# Patient Record
Sex: MALE | Race: WHITE | HISPANIC OR LATINO | Employment: FULL TIME | URBAN - METROPOLITAN AREA
[De-identification: names, ages, dates, MRNs, and addresses within clinical notes are randomized per-mention and may not be internally consistent; named-entity substitution may affect disease eponyms.]

---

## 2017-05-15 ENCOUNTER — HOSPITAL ENCOUNTER (EMERGENCY)
Facility: HOSPITAL | Age: 37
Discharge: HOME | End: 2017-05-15
Attending: EMERGENCY MEDICINE
Payer: COMMERCIAL

## 2017-05-15 ENCOUNTER — APPOINTMENT (EMERGENCY)
Dept: RADIOLOGY | Facility: HOSPITAL | Age: 37
End: 2017-05-15
Payer: COMMERCIAL

## 2017-05-15 VITALS
DIASTOLIC BLOOD PRESSURE: 86 MMHG | TEMPERATURE: 97.7 F | RESPIRATION RATE: 20 BRPM | SYSTOLIC BLOOD PRESSURE: 137 MMHG | WEIGHT: 145 LBS | HEART RATE: 79 BPM | OXYGEN SATURATION: 98 %

## 2017-05-15 DIAGNOSIS — J40 BRONCHITIS: Primary | ICD-10-CM

## 2017-05-15 LAB
ALBUMIN SERPL BCP-MCNC: 3.4 G/DL (ref 3.5–5)
ALP SERPL-CCNC: 89 U/L (ref 46–116)
ALT SERPL W P-5'-P-CCNC: 23 U/L (ref 12–78)
ANION GAP SERPL CALCULATED.3IONS-SCNC: 10 MMOL/L (ref 4–13)
AST SERPL W P-5'-P-CCNC: 13 U/L (ref 5–45)
BASOPHILS # BLD AUTO: 0 THOUSANDS/ΜL (ref 0–0.1)
BASOPHILS NFR BLD AUTO: 0 % (ref 0–1)
BILIRUB SERPL-MCNC: 0.5 MG/DL (ref 0.2–1)
BUN SERPL-MCNC: 18 MG/DL (ref 5–25)
CALCIUM SERPL-MCNC: 8.8 MG/DL (ref 8.3–10.1)
CHLORIDE SERPL-SCNC: 103 MMOL/L (ref 100–108)
CO2 SERPL-SCNC: 27 MMOL/L (ref 21–32)
CREAT SERPL-MCNC: 0.89 MG/DL (ref 0.6–1.3)
DEPRECATED D DIMER PPP: 280 NG/ML (FEU) (ref 190–520)
EOSINOPHIL # BLD AUTO: 0.7 THOUSAND/ΜL (ref 0–0.61)
EOSINOPHIL NFR BLD AUTO: 5 % (ref 0–6)
ERYTHROCYTE [DISTWIDTH] IN BLOOD BY AUTOMATED COUNT: 13.7 % (ref 11.6–15.1)
GFR SERPL CREATININE-BSD FRML MDRD: >60 ML/MIN/1.73SQ M
GLUCOSE SERPL-MCNC: 111 MG/DL (ref 65–140)
HCT VFR BLD AUTO: 42.5 % (ref 42–52)
HGB BLD-MCNC: 14.3 G/DL (ref 14–18)
LYMPHOCYTES # BLD AUTO: 2 THOUSANDS/ΜL (ref 0.6–4.47)
LYMPHOCYTES NFR BLD AUTO: 14 % (ref 14–44)
MCH RBC QN AUTO: 30.4 PG (ref 27–31)
MCHC RBC AUTO-ENTMCNC: 33.5 G/DL (ref 31.4–37.4)
MCV RBC AUTO: 91 FL (ref 82–98)
MONOCYTES # BLD AUTO: 0.7 THOUSAND/ΜL (ref 0.17–1.22)
MONOCYTES NFR BLD AUTO: 5 % (ref 4–12)
NEUTROPHILS # BLD AUTO: 10.2 THOUSANDS/ΜL (ref 1.85–7.62)
NEUTS SEG NFR BLD AUTO: 75 % (ref 43–75)
NRBC BLD AUTO-RTO: 0 /100 WBCS
PLATELET # BLD AUTO: 245 THOUSANDS/UL (ref 130–400)
PMV BLD AUTO: 7.8 FL (ref 8.9–12.7)
POTASSIUM SERPL-SCNC: 4.1 MMOL/L (ref 3.5–5.3)
PROT SERPL-MCNC: 7.2 G/DL (ref 6.4–8.2)
RBC # BLD AUTO: 4.69 MILLION/UL (ref 4.7–6.1)
SODIUM SERPL-SCNC: 140 MMOL/L (ref 136–145)
TROPONIN I SERPL-MCNC: <0.02 NG/ML
WBC # BLD AUTO: 13.6 THOUSAND/UL (ref 4.8–10.8)

## 2017-05-15 PROCEDURE — 84484 ASSAY OF TROPONIN QUANT: CPT | Performed by: EMERGENCY MEDICINE

## 2017-05-15 PROCEDURE — 93005 ELECTROCARDIOGRAM TRACING: CPT | Performed by: EMERGENCY MEDICINE

## 2017-05-15 PROCEDURE — 85379 FIBRIN DEGRADATION QUANT: CPT | Performed by: EMERGENCY MEDICINE

## 2017-05-15 PROCEDURE — 96374 THER/PROPH/DIAG INJ IV PUSH: CPT

## 2017-05-15 PROCEDURE — 99285 EMERGENCY DEPT VISIT HI MDM: CPT

## 2017-05-15 PROCEDURE — 71020 HB CHEST X-RAY 2VW FRONTAL&LATL: CPT

## 2017-05-15 PROCEDURE — 80053 COMPREHEN METABOLIC PANEL: CPT | Performed by: EMERGENCY MEDICINE

## 2017-05-15 PROCEDURE — 85025 COMPLETE CBC W/AUTO DIFF WBC: CPT | Performed by: EMERGENCY MEDICINE

## 2017-05-15 PROCEDURE — 94640 AIRWAY INHALATION TREATMENT: CPT

## 2017-05-15 PROCEDURE — 36415 COLL VENOUS BLD VENIPUNCTURE: CPT | Performed by: EMERGENCY MEDICINE

## 2017-05-15 PROCEDURE — 96361 HYDRATE IV INFUSION ADD-ON: CPT

## 2017-05-15 RX ORDER — PSEUDOEPHEDRINE HYDROCHLORIDE 30 MG/1
30 TABLET ORAL EVERY 6 HOURS PRN
Qty: 15 TABLET | Refills: 0 | Status: SHIPPED | OUTPATIENT
Start: 2017-05-15 | End: 2020-10-24

## 2017-05-15 RX ORDER — ALBUTEROL SULFATE 90 UG/1
2 AEROSOL, METERED RESPIRATORY (INHALATION) EVERY 6 HOURS PRN
COMMUNITY

## 2017-05-15 RX ORDER — IBUPROFEN 600 MG/1
600 TABLET ORAL EVERY 6 HOURS PRN
Qty: 15 TABLET | Refills: 0 | Status: SHIPPED | OUTPATIENT
Start: 2017-05-15 | End: 2020-10-24

## 2017-05-15 RX ORDER — AZITHROMYCIN 250 MG/1
500 TABLET, FILM COATED ORAL ONCE
Status: COMPLETED | OUTPATIENT
Start: 2017-05-15 | End: 2017-05-15

## 2017-05-15 RX ORDER — KETOROLAC TROMETHAMINE 30 MG/ML
30 INJECTION, SOLUTION INTRAMUSCULAR; INTRAVENOUS ONCE
Status: COMPLETED | OUTPATIENT
Start: 2017-05-15 | End: 2017-05-15

## 2017-05-15 RX ORDER — PREDNISONE 50 MG/1
50 TABLET ORAL DAILY
Qty: 4 TABLET | Refills: 0 | Status: SHIPPED | OUTPATIENT
Start: 2017-05-15 | End: 2017-05-19

## 2017-05-15 RX ORDER — IPRATROPIUM BROMIDE AND ALBUTEROL SULFATE 2.5; .5 MG/3ML; MG/3ML
3 SOLUTION RESPIRATORY (INHALATION) ONCE
Status: COMPLETED | OUTPATIENT
Start: 2017-05-15 | End: 2017-05-15

## 2017-05-15 RX ORDER — ALBUTEROL SULFATE 2.5 MG/3ML
2.5 SOLUTION RESPIRATORY (INHALATION) EVERY 6 HOURS PRN
COMMUNITY

## 2017-05-15 RX ORDER — ALBUTEROL SULFATE 90 UG/1
2 AEROSOL, METERED RESPIRATORY (INHALATION) EVERY 4 HOURS PRN
Qty: 1 INHALER | Refills: 0 | Status: SHIPPED | OUTPATIENT
Start: 2017-05-15 | End: 2017-06-14

## 2017-05-15 RX ORDER — PREDNISONE 20 MG/1
60 TABLET ORAL ONCE
Status: COMPLETED | OUTPATIENT
Start: 2017-05-15 | End: 2017-05-15

## 2017-05-15 RX ORDER — PSEUDOEPHEDRINE HYDROCHLORIDE 30 MG/1
30 TABLET ORAL ONCE
Status: COMPLETED | OUTPATIENT
Start: 2017-05-15 | End: 2017-05-15

## 2017-05-15 RX ORDER — AZITHROMYCIN 250 MG/1
250 TABLET, FILM COATED ORAL DAILY
Qty: 4 TABLET | Refills: 0 | Status: SHIPPED | OUTPATIENT
Start: 2017-05-15 | End: 2017-05-19

## 2017-05-15 RX ADMIN — IPRATROPIUM BROMIDE AND ALBUTEROL SULFATE 3 ML: .5; 3 SOLUTION RESPIRATORY (INHALATION) at 07:39

## 2017-05-15 RX ADMIN — AZITHROMYCIN 500 MG: 250 TABLET, FILM COATED ORAL at 08:40

## 2017-05-15 RX ADMIN — PSEUDOEPHEDRINE HCL 30 MG: 30 TABLET, COATED ORAL at 07:39

## 2017-05-15 RX ADMIN — PREDNISONE 60 MG: 20 TABLET ORAL at 08:40

## 2017-05-15 RX ADMIN — IPRATROPIUM BROMIDE AND ALBUTEROL SULFATE 3 ML: .5; 3 SOLUTION RESPIRATORY (INHALATION) at 08:40

## 2017-05-15 RX ADMIN — SODIUM CHLORIDE 1000 ML: 0.9 INJECTION, SOLUTION INTRAVENOUS at 07:39

## 2017-05-15 RX ADMIN — KETOROLAC TROMETHAMINE 30 MG: 30 INJECTION, SOLUTION INTRAMUSCULAR at 07:39

## 2017-05-16 LAB
ATRIAL RATE: 68 BPM
P AXIS: 29 DEGREES
PR INTERVAL: 146 MS
QRS AXIS: 47 DEGREES
QRSD INTERVAL: 92 MS
QT INTERVAL: 380 MS
QTC INTERVAL: 404 MS
T WAVE AXIS: 33 DEGREES
VENTRICULAR RATE: 68 BPM

## 2017-11-24 ENCOUNTER — GENERIC CONVERSION - ENCOUNTER (OUTPATIENT)
Dept: OTHER | Facility: OTHER | Age: 37
End: 2017-11-24

## 2017-12-13 ENCOUNTER — APPOINTMENT (EMERGENCY)
Dept: RADIOLOGY | Facility: HOSPITAL | Age: 37
End: 2017-12-13
Payer: COMMERCIAL

## 2017-12-13 ENCOUNTER — HOSPITAL ENCOUNTER (EMERGENCY)
Facility: HOSPITAL | Age: 37
Discharge: HOME | End: 2017-12-13
Attending: EMERGENCY MEDICINE | Admitting: EMERGENCY MEDICINE
Payer: COMMERCIAL

## 2017-12-13 VITALS
WEIGHT: 147 LBS | RESPIRATION RATE: 20 BRPM | TEMPERATURE: 97.7 F | OXYGEN SATURATION: 96 % | DIASTOLIC BLOOD PRESSURE: 66 MMHG | BODY MASS INDEX: 21.77 KG/M2 | HEART RATE: 86 BPM | SYSTOLIC BLOOD PRESSURE: 130 MMHG | HEIGHT: 69 IN

## 2017-12-13 DIAGNOSIS — J45.909 ASTHMA: Primary | ICD-10-CM

## 2017-12-13 PROCEDURE — 71010 HB CHEST X-RAY 1 VIEW FRONTAL (PORTABLE): CPT

## 2017-12-13 PROCEDURE — 94640 AIRWAY INHALATION TREATMENT: CPT

## 2017-12-13 PROCEDURE — 99283 EMERGENCY DEPT VISIT LOW MDM: CPT

## 2017-12-13 RX ORDER — PREDNISONE 20 MG/1
40 TABLET ORAL DAILY
Qty: 10 TABLET | Refills: 0 | Status: SHIPPED | OUTPATIENT
Start: 2017-12-13 | End: 2017-12-18

## 2017-12-13 RX ORDER — IPRATROPIUM BROMIDE AND ALBUTEROL SULFATE 2.5; .5 MG/3ML; MG/3ML
3 SOLUTION RESPIRATORY (INHALATION) ONCE
Status: COMPLETED | OUTPATIENT
Start: 2017-12-13 | End: 2017-12-13

## 2017-12-13 RX ORDER — PREDNISONE 20 MG/1
60 TABLET ORAL ONCE
Status: COMPLETED | OUTPATIENT
Start: 2017-12-13 | End: 2017-12-13

## 2017-12-13 RX ADMIN — PREDNISONE 60 MG: 20 TABLET ORAL at 15:25

## 2017-12-13 RX ADMIN — IPRATROPIUM BROMIDE AND ALBUTEROL SULFATE 3 ML: .5; 3 SOLUTION RESPIRATORY (INHALATION) at 15:26

## 2017-12-13 NOTE — DISCHARGE INSTRUCTIONS
Asma   LO QUE NECESITA SABER:   El asma es yang enfermedad pulmonar que dificulta la respiración  La inflamación crónica y las reacciones a los desencadenantes estrechan las vías respiratorias en los pulmones  El asma puede ser de peligro mortal si no se mantiene bajo control  INSTRUCCIONES SOBRE [facility]ITAL[facility]:   Busque atención médica de inmediato si:   · Usted tiene falta de aliento severa  · Danielle labios y [place] se ponen azules o grises  · La piel alrededor de damon spencer y costillas se hunde con cada respiración  · Usted tiene falta de [place], incluso después de silviano damon medicamento a corto plazo según lo indicado  · Las lecturas numéricas del medidor de damon flujo vernell están en la astrid qamar de damon plan de acción para el asma  Pregúntele a damon Severiano Nones vitaminas y minerales son adecuados para usted  · A usted se le acaba el medicamento antes de la fecha de damon próximo abastecimiento  · Danielle síntomas empeoran  · Usted necesita silviano más medicamento que lo acostumbrado para controlar danielle síntomas  · Usted tiene preguntas o inquietudes acerca de damon condición o cuidado  Medicamentos:   · Medicamentos,  disminuyen la inflamación, abren las vías respiratorias y facilitan damon respiración  Los medicamentos se pueden inhalar, silviano en forma de píldora o ser inyectados  Los medicamentos a corto plazo alivian danielle síntomas con Yari Skains  Los medicamentos a haris plazo sirven para evitar ataques en un futuro  Es posible que además necesite medicamento para ayudar a controlar las alergias  Pregúntele a damon médico por más información sobre el medicamento que le están dando y cómo tomarlo de yang forma Eulice Richey  · [place] danielle medicamentos casie se le haya indicado  Consulte con damon médico si usted maddie que damon medicamento no le está ayudando o si presenta efectos secundarios  Infórmele si es alérgico a cualquier medicamento   Mantenga yang lista actualizada de los [place], las vitaminas y METHLICK productos herbales que bhanu  Incluya los siguientes datos de los medicamentos: cantidad, frecuencia y motivo de administración  Traiga con usted la lista o los envases de la píldoras a lyndon citas de seguimiento  Lleve la lista de los medicamentos con usted en shaun de yang emergencia  Acuda a lyndon consultas de control con damon médico según le indicaron  Usted necesitará regresar para asegurarse que damon medicamento está funcionando y lyndon síntomas están bajo control  Es posible que lo refieran a un especialista del asma  A usted podrían pedirle que [address] [place] [address] Street un registro de los valores de damon flujo vernell y que lo traiga a lyndon citas  Anote lyndon preguntas para que se acuerde de hacerlas  Controle lyndon síntomas y evite ataques futuros:   · Siga damon plan de acción para el asma  [place] es un plan por escrito que usted y damon médico boswell creado  Explica cuáles medicamentos necesita usted y cuándo cambiar las dosis si fuera necesario  Además explica casie usted puede monitorear lyndon síntomas y usar un medidor del flujo vernlel  El medidor mide qué tan tulio están funcionando los pulmones  · Controle otras afecciones de [place] , casie las [place], el reflujo estomacal y la apnea de sueño  · Identifique y evite factores desencadenantes  Estos podrían [employer], los ácaros del [place], el moho y las cucarachas  · No fume o esté alrededor de personas que fuman  La nicotina y otras sustancias químicas que contienen los cigarrillos y cigarros pueden dañar los pulmones  Pida información a damon médico si usted actualmente fuma y necesita ayuda para dejar de fumar  Los cigarrillos electrónicos o tabaco sin humo todavía contienen nicotina  Consulte con damon médico antes de [employer]  · Pregunte sobre la vacuna contra la gripe  La gripe puede empeorar damon asma  Puede que usted necesite recibir yang vacuna anual contra la gripe    © 2017 [address] Information is for End User's use only and may not be sold, redistributed or otherwise used for commercial purposes  All illustrations and images included in CareNotes® are the copyrighted property of A D A M , Inc  or Familia Bey  Esta información es sólo para uso en educación  Damon intención no es darle un consejo médico sobre enfermedades o tratamientos  Colsulte con damon Dewain Racer farmacéutico antes de seguir cualquier régimen médico para saber si es seguro y efectivo para usted

## 2017-12-14 NOTE — ED PROVIDER NOTES
History  Chief Complaint   Patient presents with    Asthma     pt c/o exac asthma x a couple weeks  states cough x 1 month  Patient presents for dyspnea  History of asthma worse over the last month  Denies fever  Was seen at [facility] recently and given steroids which improved symptoms but have since gotten worse since stopping them  Was suppose to get cxr but never followed up  History provided by:  Patient   used: No    Asthma   Associated symptoms: cough and shortness of breath    Associated symptoms: no fever        Prior to Admission Medications   Prescriptions Last Dose Informant Patient Reported? Taking? albuterol (2 5 mg/3 mL) 0 083 % nebulizer solution   Yes No   Sig: Take 2 5 mg by nebulization every 6 (six) hours as needed for wheezing   albuterol (PROVENTIL HFA,VENTOLIN HFA) 90 mcg/act inhaler   Yes No   Sig: Inhale 2 puffs every 6 (six) hours as needed for wheezing   ibuprofen (MOTRIN) 600 mg tablet   No No   Sig: Take 1 tablet by mouth every 6 (six) hours as needed for mild pain for up to 10 days   pseudoephedrine (SUDAFED) 30 mg tablet   No No   Sig: Take 1 tablet by mouth every 6 (six) hours as needed for congestion for up to 10 days      Facility-Administered Medications: None       Past Medical History:   Diagnosis Date    Asthma     Diverticulitis     Pneumothorax        History reviewed  No pertinent surgical history  History reviewed  No pertinent family history  I have reviewed and agree with the history as documented  Social History   Substance Use Topics    Smoking status: Former Smoker    Smokeless tobacco: Never Used    Alcohol use Yes        Review of Systems   Constitutional: Negative for chills and fever  Respiratory: Positive for cough, shortness of breath and stridor  All other systems reviewed and are negative        Physical Exam  ED Triage Vitals [12/13/17 1507]   Temperature Pulse Respirations Blood Pressure SpO2   97 7 °F (36 5 °C) 86 20 130/66 96 %      Temp Source Heart Rate Source Patient Position - Orthostatic VS BP Location FiO2 (%)   Oral Monitor Sitting Right arm --      Pain Score       5           Orthostatic Vital Signs  Vitals:    12/13/17 1507   BP: 130/66   Pulse: 86   Patient Position - Orthostatic VS: Sitting       Physical Exam   Constitutional: He is oriented to person, place, and time  No distress  HENT:   Mouth/Throat: Oropharynx is clear and moist    Eyes: Pupils are equal, round, and reactive to light  Neck: Normal range of motion  Cardiovascular: Normal rate, regular rhythm and intact distal pulses  Pulmonary/Chest: He is in respiratory distress  He has wheezes  Mild respiratory distress  Abdominal: Soft  There is no tenderness  Musculoskeletal: Normal range of motion  Neurological: He is alert and oriented to person, place, and time  Skin: Capillary refill takes less than 2 seconds  He is not diaphoretic  Nursing note and vitals reviewed  ED Medications  Medications   ipratropium-albuterol (DUO-NEB) 0 5-2 5 mg/mL inhalation solution 3 mL (3 mL Nebulization Given 12/13/17 1526)   ipratropium-albuterol (DUO-NEB) 0 5-2 5 mg/mL inhalation solution 3 mL (3 mL Nebulization Given 12/13/17 1526)   predniSONE tablet 60 mg (60 mg Oral Given 12/13/17 1525)       Diagnostic Studies  Results Reviewed     None                 XR chest 1 view portable   Final Result by Froy Hidalgo MD (12/13 1555)      No active pulmonary disease  Workstation performed: [id number]                    Procedures  Procedures       Phone Contacts  ED Phone Contact    ED Course  ED Course                                MDM  Number of Diagnoses or Management Options  Asthma:   Diagnosis management comments: Pulse ox 96% on RA indicating adequate oxygenation  CXR: NAD as read by me    On re-exam patient improved  Advised follow up with PMD for better long term asthma control          Amount and/or Complexity of Data Reviewed  Tests in the radiology section of CPT®: ordered and reviewed  Decide to obtain previous medical records or to obtain history from someone other than the patient: yes  Review and summarize past medical records: yes  Independent visualization of images, tracings, or specimens: yes    Patient Progress  Patient progress: improved    CritCare Time    Disposition  Final diagnoses:   Asthma     Time reflects when diagnosis was documented in both MDM as applicable and the Disposition within this note     Time User Action Codes Description Comment    12/13/2017  4:23 PM Gucci Cullen Asthma       ED Disposition     ED Disposition Condition Comment    Discharge  [address] discharge to home/self care      Condition at discharge: stable        Follow-up Information     Follow up With Specialties Details Why [address]  In 3 days  Shannon Mc [phone]        Discharge Medication List as of 12/13/2017  4:24 PM      START taking these medications    Details   predniSONE 20 mg tablet Take 2 tablets by mouth daily for 5 days, Starting Wed 12/13/2017, Until Mon 12/18/2017, Print         CONTINUE these medications which have NOT CHANGED    Details   albuterol (2 5 mg/3 mL) 0 083 % nebulizer solution Take 2 5 mg by nebulization every 6 (six) hours as needed for wheezing, Until Discontinued, Historical Med      albuterol (PROVENTIL HFA,VENTOLIN HFA) 90 mcg/act inhaler Inhale 2 puffs every 6 (six) hours as needed for wheezing, Until Discontinued, Historical Med      ibuprofen (MOTRIN) 600 mg tablet Take 1 tablet by mouth every 6 (six) hours as needed for mild pain for up to 10 days, Starting 5/15/2017, Until Thu 5/25/17, Print      pseudoephedrine (SUDAFED) 30 mg tablet Take 1 tablet by mouth every 6 (six) hours as needed for congestion for up to 10 days, Starting 5/15/2017, Until Thu 5/25/17, Print           No discharge procedures on file     ED Provider  Electronically Signed by           Francisco Shields DO  12/13/17 Yaritza Nguyen

## 2018-01-22 VITALS
HEIGHT: 70 IN | TEMPERATURE: 97.9 F | OXYGEN SATURATION: 98 % | BODY MASS INDEX: 22.05 KG/M2 | HEART RATE: 87 BPM | WEIGHT: 154 LBS | SYSTOLIC BLOOD PRESSURE: 100 MMHG | DIASTOLIC BLOOD PRESSURE: 70 MMHG | RESPIRATION RATE: 20 BRPM

## 2019-10-07 ENCOUNTER — APPOINTMENT (EMERGENCY)
Dept: RADIOLOGY | Facility: HOSPITAL | Age: 39
End: 2019-10-07
Payer: COMMERCIAL

## 2019-10-07 ENCOUNTER — HOSPITAL ENCOUNTER (EMERGENCY)
Facility: HOSPITAL | Age: 39
Discharge: HOME | End: 2019-10-07
Attending: EMERGENCY MEDICINE
Payer: COMMERCIAL

## 2019-10-07 VITALS
OXYGEN SATURATION: 96 % | WEIGHT: 160 LBS | HEART RATE: 64 BPM | TEMPERATURE: 96.9 F | DIASTOLIC BLOOD PRESSURE: 72 MMHG | RESPIRATION RATE: 16 BRPM | SYSTOLIC BLOOD PRESSURE: 133 MMHG | BODY MASS INDEX: 23.63 KG/M2

## 2019-10-07 DIAGNOSIS — J45.901 ASTHMA EXACERBATION: Primary | ICD-10-CM

## 2019-10-07 LAB
ANION GAP SERPL CALCULATED.3IONS-SCNC: 8 MMOL/L (ref 4–13)
BASOPHILS # BLD AUTO: 0.04 THOUSANDS/ΜL (ref 0–0.1)
BASOPHILS NFR BLD AUTO: 1 % (ref 0–1)
BUN SERPL-MCNC: 15 MG/DL (ref 5–25)
CALCIUM SERPL-MCNC: 8.5 MG/DL (ref 8.3–10.1)
CHLORIDE SERPL-SCNC: 105 MMOL/L (ref 100–108)
CO2 SERPL-SCNC: 26 MMOL/L (ref 21–32)
CREAT SERPL-MCNC: 0.91 MG/DL (ref 0.6–1.3)
EOSINOPHIL # BLD AUTO: 0.71 THOUSAND/ΜL (ref 0–0.61)
EOSINOPHIL NFR BLD AUTO: 13 % (ref 0–6)
ERYTHROCYTE [DISTWIDTH] IN BLOOD BY AUTOMATED COUNT: 12.8 % (ref 11.6–15.1)
GFR SERPL CREATININE-BSD FRML MDRD: 106 ML/MIN/1.73SQ M
GLUCOSE SERPL-MCNC: 100 MG/DL (ref 65–140)
HCT VFR BLD AUTO: 44.2 % (ref 36.5–49.3)
HGB BLD-MCNC: 14.5 G/DL (ref 12–17)
IMM GRANULOCYTES # BLD AUTO: 0.01 THOUSAND/UL (ref 0–0.2)
IMM GRANULOCYTES NFR BLD AUTO: 0 % (ref 0–2)
LYMPHOCYTES # BLD AUTO: 2.17 THOUSANDS/ΜL (ref 0.6–4.47)
LYMPHOCYTES NFR BLD AUTO: 38 % (ref 14–44)
MCH RBC QN AUTO: 30.6 PG (ref 26.8–34.3)
MCHC RBC AUTO-ENTMCNC: 32.8 G/DL (ref 31.4–37.4)
MCV RBC AUTO: 93 FL (ref 82–98)
MONOCYTES # BLD AUTO: 0.45 THOUSAND/ΜL (ref 0.17–1.22)
MONOCYTES NFR BLD AUTO: 8 % (ref 4–12)
NEUTROPHILS # BLD AUTO: 2.3 THOUSANDS/ΜL (ref 1.85–7.62)
NEUTS SEG NFR BLD AUTO: 40 % (ref 43–75)
NRBC BLD AUTO-RTO: 0 /100 WBCS
PLATELET # BLD AUTO: 235 THOUSANDS/UL (ref 149–390)
PMV BLD AUTO: 10 FL (ref 8.9–12.7)
POTASSIUM SERPL-SCNC: 3.8 MMOL/L (ref 3.5–5.3)
RBC # BLD AUTO: 4.74 MILLION/UL (ref 3.88–5.62)
SODIUM SERPL-SCNC: 139 MMOL/L (ref 136–145)
TROPONIN I SERPL-MCNC: <0.02 NG/ML
WBC # BLD AUTO: 5.68 THOUSAND/UL (ref 4.31–10.16)

## 2019-10-07 PROCEDURE — 93005 ELECTROCARDIOGRAM TRACING: CPT

## 2019-10-07 PROCEDURE — 99285 EMERGENCY DEPT VISIT HI MDM: CPT

## 2019-10-07 PROCEDURE — 80048 BASIC METABOLIC PNL TOTAL CA: CPT | Performed by: EMERGENCY MEDICINE

## 2019-10-07 PROCEDURE — 85025 COMPLETE CBC W/AUTO DIFF WBC: CPT | Performed by: EMERGENCY MEDICINE

## 2019-10-07 PROCEDURE — 84484 ASSAY OF TROPONIN QUANT: CPT | Performed by: EMERGENCY MEDICINE

## 2019-10-07 PROCEDURE — 96374 THER/PROPH/DIAG INJ IV PUSH: CPT

## 2019-10-07 PROCEDURE — 71045 X-RAY EXAM CHEST 1 VIEW: CPT

## 2019-10-07 PROCEDURE — 94640 AIRWAY INHALATION TREATMENT: CPT

## 2019-10-07 PROCEDURE — 36415 COLL VENOUS BLD VENIPUNCTURE: CPT | Performed by: EMERGENCY MEDICINE

## 2019-10-07 RX ORDER — IPRATROPIUM BROMIDE AND ALBUTEROL SULFATE 2.5; .5 MG/3ML; MG/3ML
3 SOLUTION RESPIRATORY (INHALATION) ONCE
Status: COMPLETED | OUTPATIENT
Start: 2019-10-07 | End: 2019-10-07

## 2019-10-07 RX ORDER — ALBUTEROL SULFATE 2.5 MG/3ML
2.5 SOLUTION RESPIRATORY (INHALATION) EVERY 6 HOURS PRN
Qty: 75 ML | Refills: 0 | Status: SHIPPED | OUTPATIENT
Start: 2019-10-07 | End: 2020-10-24

## 2019-10-07 RX ORDER — METHYLPREDNISOLONE SODIUM SUCCINATE 125 MG/2ML
125 INJECTION, POWDER, LYOPHILIZED, FOR SOLUTION INTRAMUSCULAR; INTRAVENOUS ONCE
Status: COMPLETED | OUTPATIENT
Start: 2019-10-07 | End: 2019-10-07

## 2019-10-07 RX ORDER — PREDNISONE 20 MG/1
40 TABLET ORAL DAILY
Qty: 8 TABLET | Refills: 0 | Status: SHIPPED | OUTPATIENT
Start: 2019-10-07 | End: 2019-10-11

## 2019-10-07 RX ADMIN — IPRATROPIUM BROMIDE AND ALBUTEROL SULFATE 3 ML: 2.5; .5 SOLUTION RESPIRATORY (INHALATION) at 11:03

## 2019-10-07 RX ADMIN — METHYLPREDNISOLONE SODIUM SUCCINATE 125 MG: 125 INJECTION, POWDER, FOR SOLUTION INTRAMUSCULAR; INTRAVENOUS at 11:04

## 2019-10-07 NOTE — ED PROVIDER NOTES
History  Chief Complaint   Patient presents with    Asthma     x 3 days  started having chest pain last night  hx pneumothorax    Chest Pain     Hx ASTHMA  C/O ATTACK X 3 DAYS, COUGH AND CP      History provided by:  Patient  Asthma   Severity:  Unable to specify  Onset quality:  Gradual  Duration:  3 days  Timing:  Constant  Progression:  Worsening  Chronicity:  Recurrent  Associated symptoms: chest pain, cough and wheezing        Prior to Admission Medications   Prescriptions Last Dose Informant Patient Reported? Taking? albuterol (2 5 mg/3 mL) 0 083 % nebulizer solution   Yes No   Sig: Take 2 5 mg by nebulization every 6 (six) hours as needed for wheezing   albuterol (PROVENTIL HFA,VENTOLIN HFA) 90 mcg/act inhaler   Yes No   Sig: Inhale 2 puffs every 6 (six) hours as needed for wheezing   ibuprofen (MOTRIN) 600 mg tablet   No No   Sig: Take 1 tablet by mouth every 6 (six) hours as needed for mild pain for up to 10 days   pseudoephedrine (SUDAFED) 30 mg tablet   No No   Sig: Take 1 tablet by mouth every 6 (six) hours as needed for congestion for up to 10 days      Facility-Administered Medications: None       Past Medical History:   Diagnosis Date    Asthma     Diverticulitis     Pneumothorax        History reviewed  No pertinent surgical history  History reviewed  No pertinent family history  I have reviewed and agree with the history as documented  Social History     Tobacco Use    Smoking status: Former Smoker    Smokeless tobacco: Never Used   Substance Use Topics    Alcohol use: Yes     Comment: occasionally    Drug use: No        Review of Systems   Respiratory: Positive for cough and wheezing  Cardiovascular: Positive for chest pain  All other systems reviewed and are negative  Physical Exam  Physical Exam   Constitutional: He appears well-developed and well-nourished  Non-toxic appearance  He does not appear ill  Eyes: Pupils are equal, round, and reactive to light     Neck: No JVD present  Cardiovascular: Normal rate and regular rhythm  Pulmonary/Chest: He has wheezes  MILD DIFFUSE B/L WHEEZING   Abdominal: Soft  Musculoskeletal:        Right lower leg: He exhibits no edema  Skin: Skin is warm and dry  Vitals reviewed        Vital Signs  ED Triage Vitals [10/07/19 1045]   Temperature Pulse Respirations Blood Pressure SpO2   (!) 96 9 °F (36 1 °C) 70 20 133/72 100 %      Temp Source Heart Rate Source Patient Position - Orthostatic VS BP Location FiO2 (%)   Tympanic Monitor Lying Right arm --      Pain Score       4           Vitals:    10/07/19 1045 10/07/19 1115 10/07/19 1130 10/07/19 1200   BP: 133/72      Pulse: 70 60 70 60   Patient Position - Orthostatic VS: Lying            Visual Acuity      ED Medications  Medications   ipratropium-albuterol (DUO-NEB) 0 5-2 5 mg/3 mL inhalation solution 3 mL (3 mL Nebulization Given 10/7/19 1103)   methylPREDNISolone sodium succinate (Solu-MEDROL) injection 125 mg (125 mg Intravenous Given 10/7/19 1104)       Diagnostic Studies  Results Reviewed     Procedure Component Value Units Date/Time    Troponin I [[id number]]  (Normal) Collected:  10/07/19 1102    Lab Status:  Final result Specimen:  Blood from Arm, Right Updated:  10/07/19 1134     Troponin I <0 02 ng/mL     Basic metabolic panel [[id number]] Collected:  10/07/19 1102    Lab Status:  Final result Specimen:  Blood from Arm, Right Updated:  10/07/19 1127     Sodium 139 mmol/L      Potassium 3 8 mmol/L      Chloride 105 mmol/L      CO2 26 mmol/L      ANION GAP 8 mmol/L      BUN 15 mg/dL      Creatinine 0 91 mg/dL      Glucose 100 mg/dL      Calcium 8 5 mg/dL      eGFR 106 ml/min/1 73sq m     Narrative:       Chao guidelines for Chronic Kidney Disease (CKD):     Stage 1 with normal or high GFR (GFR > 90 mL/min/1 73 square meters)    Stage 2 Mild CKD (GFR = 60-89 mL/min/1 73 square meters)    Stage 3A Moderate CKD (GFR = 45-59 mL/min/1 73 square meters)    Stage 3B Moderate CKD (GFR = 30-44 mL/min/1 73 square meters)    Stage 4 Severe CKD (GFR = 15-29 mL/min/1 73 square meters)    Stage 5 End Stage CKD (GFR <15 mL/min/1 73 square meters)  Note: GFR calculation is accurate only with a steady state creatinine    CBC and differential [[id number]]  (Abnormal) Collected:  10/07/19 1102    Lab Status:  Final result Specimen:  Blood from Arm, Right Updated:  10/07/19 1111     WBC 5 68 Thousand/uL      RBC 4 74 Million/uL      Hemoglobin 14 5 g/dL      Hematocrit 44 2 %      MCV 93 fL      MCH 30 6 pg      MCHC 32 8 g/dL      RDW 12 8 %      MPV 10 0 fL      Platelets 926 Thousands/uL      nRBC 0 /100 WBCs      Neutrophils Relative 40 %      Immat GRANS % 0 %      Lymphocytes Relative 38 %      Monocytes Relative 8 %      Eosinophils Relative 13 %      Basophils Relative 1 %      Neutrophils Absolute 2 30 Thousands/µL      Immature Grans Absolute 0 01 Thousand/uL      Lymphocytes Absolute 2 17 Thousands/µL      Monocytes Absolute 0 45 Thousand/µL      Eosinophils Absolute 0 71 Thousand/µL      Basophils Absolute 0 04 Thousands/µL                  XR chest portable   ED Interpretation by Leola Omer MD (10/07 1214)   NEG                 Procedures  ECG 12 Lead Documentation Only  Date/Time: 10/7/2019 10:58 AM  Performed by: Leola Omer MD  Authorized by: Leola Omer MD     ECG reviewed by me, the ED Provider: yes    Patient location:  ED  Interpretation:     Interpretation: normal    Rhythm:     Rhythm: sinus rhythm    QRS:     QRS axis:  Normal    QRS intervals:  Normal  Conduction:     Conduction: normal    ST segments:     ST segments:  Normal  T waves:     T waves: normal             ED Course                               MDM    Disposition  Final diagnoses:   Asthma exacerbation     Time reflects when diagnosis was documented in both MDM as applicable and the Disposition within this note     Time User Action Codes Description Comment    10/7/2019 12:14 PM Robin Reina Cam [J45 [id number]] Asthma exacerbation       ED Disposition     ED Disposition Condition Date/Time Comment    Discharge Stable Mon Oct 7, 2019 12:14 PM Apryl Garcia discharge to home/self care  Follow-up Information     Follow up With Specialties Details Why Contact Info    [facility]    [phone]            Patient's Medications   Discharge Prescriptions    ALBUTEROL (2 5 MG/3 ML) 0 083 % NEBULIZER SOLUTION    Take 1 vial (2 5 mg total) by nebulization every 6 (six) hours as needed for wheezing for up to 30 doses       Start Date: 10/7/2019 End Date: --       Order Dose: 2 5 mg       Quantity: 75 mL    Refills: 0    PREDNISONE 20 MG TABLET    Take 2 tablets (40 mg total) by mouth daily for 4 days       Start Date: 10/7/2019 End Date: 10/11/2019       Order Dose: 40 mg       Quantity: 8 tablet    Refills: 0     No discharge procedures on file      ED Provider  Electronically Signed by           Allie Flores MD  10/07/19 7984

## 2019-10-08 LAB
ATRIAL RATE: 77 BPM
P AXIS: 64 DEGREES
PR INTERVAL: 146 MS
QRS AXIS: 68 DEGREES
QRSD INTERVAL: 92 MS
QT INTERVAL: 394 MS
QTC INTERVAL: 445 MS
T WAVE AXIS: 58 DEGREES
VENTRICULAR RATE: 77 BPM

## 2019-10-08 PROCEDURE — 93010 ELECTROCARDIOGRAM REPORT: CPT | Performed by: INTERNAL MEDICINE

## 2020-02-18 ENCOUNTER — OFFICE VISIT (OUTPATIENT)
Dept: FAMILY MEDICINE CLINIC | Facility: CLINIC | Age: 40
End: 2020-02-18
Payer: COMMERCIAL

## 2020-02-18 VITALS
TEMPERATURE: 96.6 F | OXYGEN SATURATION: 98 % | HEART RATE: 69 BPM | SYSTOLIC BLOOD PRESSURE: 118 MMHG | BODY MASS INDEX: 23.86 KG/M2 | WEIGHT: 161.6 LBS | DIASTOLIC BLOOD PRESSURE: 64 MMHG

## 2020-02-18 DIAGNOSIS — J45.31 MILD PERSISTENT ASTHMA WITH ACUTE EXACERBATION: Primary | ICD-10-CM

## 2020-02-18 DIAGNOSIS — Z23 ENCOUNTER FOR IMMUNIZATION: ICD-10-CM

## 2020-02-18 PROCEDURE — 99213 OFFICE O/P EST LOW 20 MIN: CPT | Performed by: FAMILY MEDICINE

## 2020-02-18 PROCEDURE — 90682 RIV4 VACC RECOMBINANT DNA IM: CPT | Performed by: FAMILY MEDICINE

## 2020-02-18 PROCEDURE — 1036F TOBACCO NON-USER: CPT | Performed by: FAMILY MEDICINE

## 2020-02-18 PROCEDURE — 90471 IMMUNIZATION ADMIN: CPT | Performed by: FAMILY MEDICINE

## 2020-02-18 RX ORDER — PREDNISONE 20 MG/1
20 TABLET ORAL DAILY
Qty: 5 TABLET | Refills: 0 | Status: SHIPPED | OUTPATIENT
Start: 2020-02-18 | End: 2020-02-23

## 2020-02-18 RX ORDER — MONTELUKAST SODIUM 10 MG/1
10 TABLET ORAL
Qty: 90 TABLET | Refills: 3 | Status: SHIPPED | OUTPATIENT
Start: 2020-02-18 | End: 2021-06-25 | Stop reason: SDUPTHER

## 2020-02-18 RX ORDER — FLUTICASONE PROPIONATE 110 UG/1
2 AEROSOL, METERED RESPIRATORY (INHALATION) 2 TIMES DAILY
Qty: 1 INHALER | Refills: 2 | Status: SHIPPED | OUTPATIENT
Start: 2020-02-18 | End: 2020-10-24

## 2020-02-18 NOTE — PROGRESS NOTES
Assessment/Plan:    Diagnoses and all orders for this visit:    Mild persistent asthma with acute exacerbation  -     patient with current acute exacerbation of asthma due to increased use of medications from environmental exposure at work  Prescribed short course of prednisone for 5 days  Will also start on steroid inhaler twice a day  Instructed to rinse mouth after each use  Will also start on singular daily to improve symptom control  Recommended to call back office if symptoms do not improve or continues with worsening symptoms  -    predniSONE 20 mg tablet; Take 1 tablet (20 mg total) by mouth daily for 5 days  -     fluticasone (FLOVENT HFA) 110 MCG/ACT inhaler; Inhale 2 puffs 2 (two) times a day Rinse mouth after use  -     montelukast (SINGULAIR) 10 mg tablet; Take 1 tablet (10 mg total) by mouth daily at bedtime    Encounter for immunization  -     influenza vaccine, [id number], quadrivalent, recombinant, PF, 0 5 mL, for patients 18 yr+ (FLUBLOK)    Ample time provided during visit to answer all questions  Recommended call back office with any questions  Patient acknowledged understanding and verbally agreed to plan  Subjective:     Patient ID: Taran Chacon is a 44 y o  male  HPI   78-year-old male with past medical history of asthma  Patient reports for the past 3 weeks has noted increased need to use inhaler nebulizer treatment  Reports he normally uses inhaler before bed and waking up, or can go a few months without using it  Reports for the past 3 weeks symptoms are worse at night, uses albuterol 4-5 times at night and uses nebulizer treatment at least once a day  However has required to use nebulizer treatment 5 times in 1 day this past week  Patient reports he works in construction, 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  However 3 weeks ago had to work on demolition of ceiling and was not given an appropriate mask  Reports catheter trigger      Review of Systems   Respiratory: Positive for cough, shortness of breath and wheezing  Per HPI   Cardiovascular: Negative for chest pain, palpitations and leg swelling  Objective:  /64 (BP Location: Left arm, Patient Position: Sitting, Cuff Size: Adult)   Pulse 69   Temp (!) 96 6 °F (35 9 °C) (Tympanic)   Wt 73 3 kg (161 lb 9 6 oz)   SpO2 98%   BMI 23 86 kg/m²      Physical Exam   Constitutional: He is oriented to person, place, and time  He appears well-developed and well-nourished  No distress  HENT:   Head: Normocephalic and atraumatic  Eyes: Conjunctivae and EOM are normal    Neck: Normal range of motion  Cardiovascular: Normal rate, regular rhythm and normal heart sounds  No murmur heard  Pulmonary/Chest: Effort normal and breath sounds normal  He has no wheezes  He has no rales  Musculoskeletal: Normal range of motion  Neurological: He is alert and oriented to person, place, and time  Skin: Skin is warm  Psychiatric: He has a normal mood and affect  His behavior is normal  Judgment and thought content normal    Nursing note and vitals reviewed

## 2020-10-24 ENCOUNTER — TELEPHONE (OUTPATIENT)
Dept: UROLOGY | Facility: HOSPITAL | Age: 40
End: 2020-10-24

## 2020-10-24 ENCOUNTER — HOSPITAL ENCOUNTER (EMERGENCY)
Facility: HOSPITAL | Age: 40
Discharge: HOME | End: 2020-10-24
Attending: EMERGENCY MEDICINE
Payer: COMMERCIAL

## 2020-10-24 VITALS
HEIGHT: 70 IN | OXYGEN SATURATION: 96 % | HEART RATE: 61 BPM | TEMPERATURE: 98.7 F | WEIGHT: 175 LBS | SYSTOLIC BLOOD PRESSURE: 116 MMHG | BODY MASS INDEX: 25.05 KG/M2 | DIASTOLIC BLOOD PRESSURE: 68 MMHG | RESPIRATION RATE: 18 BRPM

## 2020-10-24 DIAGNOSIS — S39.94XA PENIS INJURY, INITIAL ENCOUNTER: Primary | ICD-10-CM

## 2020-10-24 LAB
BACTERIA UR QL AUTO: NORMAL /HPF
BILIRUB UR QL STRIP: NEGATIVE
CLARITY UR: CLEAR
COLOR UR: YELLOW
GLUCOSE UR STRIP-MCNC: NEGATIVE MG/DL
HGB UR QL STRIP.AUTO: ABNORMAL
KETONES UR STRIP-MCNC: NEGATIVE MG/DL
LEUKOCYTE ESTERASE UR QL STRIP: NEGATIVE
NITRITE UR QL STRIP: NEGATIVE
NON-SQ EPI CELLS URNS QL MICRO: NORMAL /HPF
PH UR STRIP.AUTO: 7 [PH] (ref 4.5–8)
PROT UR STRIP-MCNC: NEGATIVE MG/DL
RBC #/AREA URNS AUTO: NORMAL /HPF
SP GR UR STRIP.AUTO: 1.02 (ref 1–1.03)
UROBILINOGEN UR QL STRIP.AUTO: 0.2 E.U./DL
WBC #/AREA URNS AUTO: NORMAL /HPF

## 2020-10-24 PROCEDURE — 99284 EMERGENCY DEPT VISIT MOD MDM: CPT | Performed by: EMERGENCY MEDICINE

## 2020-10-24 PROCEDURE — 99283 EMERGENCY DEPT VISIT LOW MDM: CPT

## 2020-10-24 PROCEDURE — 81001 URINALYSIS AUTO W/SCOPE: CPT

## 2020-10-24 PROCEDURE — 99244 OFF/OP CNSLTJ NEW/EST MOD 40: CPT | Performed by: UROLOGY

## 2020-10-24 RX ORDER — FLUTICASONE PROPIONATE 50 MCG
1 SPRAY, SUSPENSION (ML) NASAL ONCE
Status: DISCONTINUED | OUTPATIENT
Start: 2020-10-24 | End: 2020-10-24

## 2021-01-04 ENCOUNTER — HOSPITAL ENCOUNTER (EMERGENCY)
Facility: HOSPITAL | Age: 41
Discharge: HOME | End: 2021-01-05
Attending: EMERGENCY MEDICINE | Admitting: EMERGENCY MEDICINE
Payer: COMMERCIAL

## 2021-01-04 ENCOUNTER — APPOINTMENT (EMERGENCY)
Dept: CT IMAGING | Facility: HOSPITAL | Age: 41
End: 2021-01-04
Payer: COMMERCIAL

## 2021-01-04 DIAGNOSIS — E86.0 DEHYDRATION: ICD-10-CM

## 2021-01-04 DIAGNOSIS — K57.32 SIGMOID DIVERTICULITIS: Primary | ICD-10-CM

## 2021-01-04 LAB
ALBUMIN SERPL BCP-MCNC: 4 G/DL (ref 3.5–5)
ALP SERPL-CCNC: 83 U/L (ref 46–116)
ALT SERPL W P-5'-P-CCNC: 26 U/L (ref 12–78)
AMORPH PHOS CRY URNS QL MICRO: NORMAL /HPF
ANION GAP SERPL CALCULATED.3IONS-SCNC: 8 MMOL/L (ref 4–13)
AST SERPL W P-5'-P-CCNC: 14 U/L (ref 5–45)
BACTERIA UR QL AUTO: NORMAL /HPF
BASOPHILS # BLD AUTO: 0.03 THOUSANDS/ΜL (ref 0–0.1)
BASOPHILS NFR BLD AUTO: 0 % (ref 0–1)
BILIRUB SERPL-MCNC: 0.38 MG/DL (ref 0.2–1)
BILIRUB UR QL STRIP: NEGATIVE
BUN SERPL-MCNC: 20 MG/DL (ref 5–25)
CALCIUM SERPL-MCNC: 9.1 MG/DL (ref 8.3–10.1)
CHLORIDE SERPL-SCNC: 103 MMOL/L (ref 100–108)
CLARITY UR: ABNORMAL
CO2 SERPL-SCNC: 26 MMOL/L (ref 21–32)
COLOR UR: YELLOW
CREAT SERPL-MCNC: 1.46 MG/DL (ref 0.6–1.3)
EOSINOPHIL # BLD AUTO: 0.15 THOUSAND/ΜL (ref 0–0.61)
EOSINOPHIL NFR BLD AUTO: 1 % (ref 0–6)
ERYTHROCYTE [DISTWIDTH] IN BLOOD BY AUTOMATED COUNT: 13.2 % (ref 11.6–15.1)
GFR SERPL CREATININE-BSD FRML MDRD: 59 ML/MIN/1.73SQ M
GLUCOSE SERPL-MCNC: 108 MG/DL (ref 65–140)
GLUCOSE UR STRIP-MCNC: NEGATIVE MG/DL
HCT VFR BLD AUTO: 44 % (ref 36.5–49.3)
HGB BLD-MCNC: 14.5 G/DL (ref 12–17)
HGB UR QL STRIP.AUTO: ABNORMAL
HOLD SPECIMEN: NORMAL
IMM GRANULOCYTES # BLD AUTO: 0.03 THOUSAND/UL (ref 0–0.2)
IMM GRANULOCYTES NFR BLD AUTO: 0 % (ref 0–2)
KETONES UR STRIP-MCNC: NEGATIVE MG/DL
LEUKOCYTE ESTERASE UR QL STRIP: NEGATIVE
LIPASE SERPL-CCNC: 91 U/L (ref 73–393)
LYMPHOCYTES # BLD AUTO: 1.92 THOUSANDS/ΜL (ref 0.6–4.47)
LYMPHOCYTES NFR BLD AUTO: 18 % (ref 14–44)
MCH RBC QN AUTO: 30.6 PG (ref 26.8–34.3)
MCHC RBC AUTO-ENTMCNC: 33 G/DL (ref 31.4–37.4)
MCV RBC AUTO: 93 FL (ref 82–98)
MONOCYTES # BLD AUTO: 0.7 THOUSAND/ΜL (ref 0.17–1.22)
MONOCYTES NFR BLD AUTO: 7 % (ref 4–12)
NEUTROPHILS # BLD AUTO: 7.87 THOUSANDS/ΜL (ref 1.85–7.62)
NEUTS SEG NFR BLD AUTO: 74 % (ref 43–75)
NITRITE UR QL STRIP: NEGATIVE
NON-SQ EPI CELLS URNS QL MICRO: NORMAL /HPF
NRBC BLD AUTO-RTO: 0 /100 WBCS
PH UR STRIP.AUTO: 7.5 [PH] (ref 4.5–8)
PLATELET # BLD AUTO: 239 THOUSANDS/UL (ref 149–390)
PMV BLD AUTO: 9.8 FL (ref 8.9–12.7)
POTASSIUM SERPL-SCNC: 3.8 MMOL/L (ref 3.5–5.3)
PROT SERPL-MCNC: 7.7 G/DL (ref 6.4–8.2)
PROT UR STRIP-MCNC: NEGATIVE MG/DL
RBC # BLD AUTO: 4.74 MILLION/UL (ref 3.88–5.62)
RBC #/AREA URNS AUTO: NORMAL /HPF
SODIUM SERPL-SCNC: 137 MMOL/L (ref 136–145)
SP GR UR STRIP.AUTO: 1.02 (ref 1–1.03)
UROBILINOGEN UR QL STRIP.AUTO: 1 E.U./DL
WBC # BLD AUTO: 10.7 THOUSAND/UL (ref 4.31–10.16)
WBC #/AREA URNS AUTO: NORMAL /HPF

## 2021-01-04 PROCEDURE — 81001 URINALYSIS AUTO W/SCOPE: CPT

## 2021-01-04 PROCEDURE — 36415 COLL VENOUS BLD VENIPUNCTURE: CPT

## 2021-01-04 PROCEDURE — 96374 THER/PROPH/DIAG INJ IV PUSH: CPT

## 2021-01-04 PROCEDURE — 99284 EMERGENCY DEPT VISIT MOD MDM: CPT | Performed by: EMERGENCY MEDICINE

## 2021-01-04 PROCEDURE — 96361 HYDRATE IV INFUSION ADD-ON: CPT

## 2021-01-04 PROCEDURE — G1004 CDSM NDSC: HCPCS

## 2021-01-04 PROCEDURE — 74177 CT ABD & PELVIS W/CONTRAST: CPT

## 2021-01-04 PROCEDURE — 99284 EMERGENCY DEPT VISIT MOD MDM: CPT

## 2021-01-04 PROCEDURE — 80053 COMPREHEN METABOLIC PANEL: CPT | Performed by: EMERGENCY MEDICINE

## 2021-01-04 PROCEDURE — 85025 COMPLETE CBC W/AUTO DIFF WBC: CPT | Performed by: EMERGENCY MEDICINE

## 2021-01-04 PROCEDURE — 83690 ASSAY OF LIPASE: CPT | Performed by: EMERGENCY MEDICINE

## 2021-01-04 RX ORDER — CIPROFLOXACIN 500 MG/1
500 TABLET, FILM COATED ORAL 2 TIMES DAILY
Qty: 14 TABLET | Refills: 0 | Status: SHIPPED | OUTPATIENT
Start: 2021-01-04 | End: 2021-01-11

## 2021-01-04 RX ORDER — KETOROLAC TROMETHAMINE 30 MG/ML
15 INJECTION, SOLUTION INTRAMUSCULAR; INTRAVENOUS ONCE
Status: COMPLETED | OUTPATIENT
Start: 2021-01-04 | End: 2021-01-04

## 2021-01-04 RX ORDER — CIPROFLOXACIN 500 MG/1
500 TABLET, FILM COATED ORAL ONCE
Status: COMPLETED | OUTPATIENT
Start: 2021-01-04 | End: 2021-01-05

## 2021-01-04 RX ORDER — METRONIDAZOLE 500 MG/1
500 TABLET ORAL ONCE
Status: COMPLETED | OUTPATIENT
Start: 2021-01-04 | End: 2021-01-05

## 2021-01-04 RX ORDER — HYDROCODONE BITARTRATE AND ACETAMINOPHEN 5; 325 MG/1; MG/1
1 TABLET ORAL EVERY 6 HOURS PRN
Qty: 12 TABLET | Refills: 0 | Status: SHIPPED | OUTPATIENT
Start: 2021-01-04 | End: 2022-03-23 | Stop reason: ALTCHOICE

## 2021-01-04 RX ORDER — METRONIDAZOLE 500 MG/1
500 TABLET ORAL EVERY 8 HOURS SCHEDULED
Qty: 21 TABLET | Refills: 0 | Status: SHIPPED | OUTPATIENT
Start: 2021-01-04 | End: 2021-01-11

## 2021-01-04 RX ADMIN — KETOROLAC TROMETHAMINE 15 MG: 30 INJECTION, SOLUTION INTRAMUSCULAR at 22:16

## 2021-01-04 RX ADMIN — SODIUM CHLORIDE 1000 ML: 0.9 INJECTION, SOLUTION INTRAVENOUS at 22:14

## 2021-01-04 RX ADMIN — IOHEXOL 100 ML: 350 INJECTION, SOLUTION INTRAVENOUS at 22:07

## 2021-01-04 NOTE — Clinical Note
Zabala Quarles was seen and treated in our emergency department on 1/4/2021  Diagnosis:     Serjio Copeland  may return to work on return date  He may return on this date: 01/07/2021         If you have any questions or concerns, please don't hesitate to call        Mary Soto MD    ______________________________           _______________          _______________  Hospital Representative                              Date                                Time

## 2021-01-05 VITALS
RESPIRATION RATE: 16 BRPM | DIASTOLIC BLOOD PRESSURE: 96 MMHG | OXYGEN SATURATION: 95 % | TEMPERATURE: 98.4 F | HEART RATE: 81 BPM | SYSTOLIC BLOOD PRESSURE: 139 MMHG

## 2021-01-05 RX ADMIN — METRONIDAZOLE 500 MG: 500 TABLET ORAL at 00:33

## 2021-01-05 RX ADMIN — CIPROFLOXACIN HYDROCHLORIDE 500 MG: 500 TABLET, FILM COATED ORAL at 00:33

## 2021-01-05 NOTE — ED PROVIDER NOTES
History  Chief Complaint   Patient presents with    Abdominal Pain     Pt presents to the ED with lower abd pain over the last 3-4 days  Hx of diverticulitis  Pt reports pain feels similiar but feels like its a little worse and goes down to his groin  History provided by:  Patient   used: No    79-year-old male presented for evaluation few days lower abdominal pain  States is been getting worse  Pain is constant moderate intensity with some radiation from the lower abdomen to the testicles  No history of abdominal surgeries  He does report having diverticulitis in the past states this feels somewhat similar  He has also had some nausea and vomiting, decreased appetite overall, some difficulty urinating, feeling like he has to go but cannot  He was able to urinate here  Denies back or flank pain  No fevers, chills  Seems uncomfortable  Tenderness across the lower abdomen without rebound or guarding  Differential diagnosis includes UTI, diverticulitis, appendicitis  Will check labs, CT, urine, control pain and will re-evaluate  Prior to Admission Medications   Prescriptions Last Dose Informant Patient Reported? Taking?    albuterol (2 5 mg/3 mL) 0 083 % nebulizer solution   Yes No   Sig: Take 2 5 mg by nebulization every 6 (six) hours as needed for wheezing   albuterol (PROVENTIL HFA,VENTOLIN HFA) 90 mcg/act inhaler   Yes No   Sig: Inhale 2 puffs every 6 (six) hours as needed for wheezing   montelukast (SINGULAIR) 10 mg tablet   No No   Sig: Take 1 tablet (10 mg total) by mouth daily at bedtime      Facility-Administered Medications: None       Past Medical History:   Diagnosis Date    Asthma     Diverticulitis     Pneumothorax        Past Surgical History:   Procedure Laterality Date    CHEST TUBE INSERTION         Family History   Problem Relation Age of Onset    Arthritis Mother     Asthma Mother     Asthma Father     Asthma Other     Colon cancer Other     Diabetes Other      I have reviewed and agree with the history as documented  E-Cigarette/Vaping    E-Cigarette Use Never User      E-Cigarette/Vaping Substances    Nicotine No     THC No     CBD No     Flavoring No     Other No     Unknown No      Social History     Tobacco Use    Smoking status: Current Some Day Smoker     Types: Cigarettes    Smokeless tobacco: Never Used   Substance Use Topics    Alcohol use: Yes     Frequency: 2-4 times a month     Comment: occasionally    Drug use: No       Review of Systems   Constitutional: Positive for appetite change  Negative for activity change, fatigue and fever  Respiratory: Negative for cough, chest tightness and shortness of breath  Cardiovascular: Negative for chest pain  Gastrointestinal: Positive for abdominal pain, nausea and vomiting  Genitourinary: Positive for difficulty urinating  Negative for flank pain  Musculoskeletal: Negative for back pain and neck pain  Neurological: Negative for dizziness, weakness and headaches  All other systems reviewed and are negative  Physical Exam  Physical Exam  Vitals signs and nursing note reviewed  Exam conducted with a chaperone present  Constitutional:       Appearance: He is well-developed  HENT:      Head: Normocephalic and atraumatic  Cardiovascular:      Rate and Rhythm: Normal rate and regular rhythm  Pulmonary:      Effort: Pulmonary effort is normal  No respiratory distress  Abdominal:      General: Abdomen is flat  There is no distension  Tenderness: There is no guarding or rebound  Comments: Tenderness across lower abdomen  Skin:     General: Skin is warm  Neurological:      General: No focal deficit present  Mental Status: He is alert and oriented to person, place, and time     Psychiatric:         Mood and Affect: Mood normal          Behavior: Behavior normal          Vital Signs  ED Triage Vitals [01/04/21 2030]   Temperature Pulse Respirations Blood Pressure SpO2   98 4 °F (36 9 °C) 87 16 168/67 97 %      Temp Source Heart Rate Source Patient Position - Orthostatic VS BP Location FiO2 (%)   Oral Monitor Sitting Right arm --      Pain Score       6           Vitals:    01/04/21 2030   BP: 168/67   Pulse: 87   Patient Position - Orthostatic VS: Sitting         Visual Acuity      ED Medications  Medications   ciprofloxacin (CIPRO) tablet 500 mg (has no administration in time range)   metroNIDAZOLE (FLAGYL) tablet 500 mg (has no administration in time range)   ketorolac (TORADOL) injection 15 mg (15 mg Intravenous Given 1/4/21 2216)   sodium chloride 0 9 % bolus 1,000 mL (1,000 mL Intravenous New Bag 1/4/21 2214)   iohexol (OMNIPAQUE) 350 MG/ML injection (MULTI-DOSE) 100 mL (100 mL Intravenous Given 1/4/21 2207)       Diagnostic Studies  Results Reviewed     Procedure Component Value Units Date/Time    Urine Microscopic [[id number]] Collected: 01/04/21 2158    Lab Status: Final result Specimen: Urine, Clean Catch Updated: 01/04/21 2237     RBC, UA 1-2 /hpf      WBC, UA 1-2 /hpf      Epithelial Cells Occasional /hpf      Bacteria, UA None Seen /hpf      AMORPH PHOSPATES Innumerable /hpf     Urine Macroscopic, POC [[id number]]  (Abnormal) Collected: 01/04/21 2158    Lab Status: Final result Specimen: Urine Updated: 01/04/21 2159     Color, UA Yellow     Clarity, UA Cloudy     pH, UA 7 5     Leukocytes, UA Negative     Nitrite, UA Negative     Protein, UA Negative mg/dl      Glucose, UA Negative mg/dl      Ketones, UA Negative mg/dl      Urobilinogen, UA 1 0 E U /dl      Bilirubin, UA Negative     Blood, UA Moderate     Specific Gravity, UA 1 025    Narrative:      CLINITEK RESULT    Comprehensive metabolic panel [[id number]]  (Abnormal) Collected: 01/04/21 2036    Lab Status: Final result Specimen: Blood from Arm, Left Updated: 01/04/21 2057     Sodium 137 mmol/L      Potassium 3 8 mmol/L      Chloride 103 mmol/L      CO2 26 mmol/L      ANION GAP 8 mmol/L      BUN 20 mg/dL      Creatinine 1 46 mg/dL      Glucose 108 mg/dL      Calcium 9 1 mg/dL      AST 14 U/L      ALT 26 U/L      Alkaline Phosphatase 83 U/L      Total Protein 7 7 g/dL      Albumin 4 0 g/dL      Total Bilirubin 0 38 mg/dL      eGFR 59 ml/min/1 73sq m     Narrative:      [place] guidelines for Chronic Kidney Disease (CKD):     Stage 1 with normal or high GFR (GFR > 90 mL/min/1 73 square meters)    Stage 2 Mild CKD (GFR = 60-89 mL/min/1 73 square meters)    Stage 3A Moderate CKD (GFR = 45-59 mL/min/1 73 square meters)    Stage 3B Moderate CKD (GFR = 30-44 mL/min/1 73 square meters)    Stage 4 Severe CKD (GFR = 15-29 mL/min/1 73 square meters)    Stage 5 End Stage CKD (GFR <15 mL/min/1 73 square meters)  Note: GFR calculation is accurate only with a steady state creatinine    Lipase [[id number]]  (Normal) Collected: 01/04/21 2036    Lab Status: Final result Specimen: Blood from Arm, Left Updated: 01/04/21 2057     Lipase 91 u/L     CBC and differential [[id number]]  (Abnormal) Collected: 01/04/21 2036    Lab Status: Final result Specimen: Blood from Arm, Left Updated: 01/04/21 2044     WBC 10 70 Thousand/uL      RBC 4 74 Million/uL      Hemoglobin 14 5 g/dL      Hematocrit 44 0 %      MCV 93 fL      MCH 30 6 pg      MCHC 33 0 g/dL      RDW 13 2 %      MPV 9 8 fL      Platelets 805 Thousands/uL      nRBC 0 /100 WBCs      Neutrophils Relative 74 %      Immat GRANS % 0 %      Lymphocytes Relative 18 %      Monocytes Relative 7 %      Eosinophils Relative 1 %      Basophils Relative 0 %      Neutrophils Absolute 7 87 Thousands/µL      Immature Grans Absolute 0 03 Thousand/uL      Lymphocytes Absolute 1 92 Thousands/µL      Monocytes Absolute 0 70 Thousand/µL      Eosinophils Absolute 0 15 Thousand/µL      Basophils Absolute 0 03 Thousands/µL                  CT abdomen pelvis with contrast   Final Result by Danni Fox MD (01/04 2247)      Distal sigmoid diverticulitis and colitis  No evidence of bowel perforation, obstruction or abscess  Workstation performed: [id number]                    Procedures  Procedures         ED Course  ED Course as of Jan 04 2339   Brooke Sutton Jan 04, 2021   2326 Discussed CT and lab results  SBIRT 22yo+      Most Recent Value   SBIRT (24 yo +)   In order to provide better care to our patients, we are screening all of our patients for alcohol and drug use  Would it be okay to ask you these screening questions? Unable to answer at this time Filed at: 01/04/2021 2127                    MDM  Number of Diagnoses or Management Options  Dehydration: new and requires workup  Sigmoid diverticulitis: new and requires workup  Diagnosis management comments: 27-year-old male presented for evaluation of some lower abdominal pain some nausea, vomiting and some hesitancy with urination  No fevers or chills  History of diverticulitis and reported that it felt similar  Tender across the lower abdomen on exam   Notable sigmoid diverticulitis on CT  Lab work notable for elevated creatinine from baseline  Was given IV fluids here in started on Cipro/Flagyl  Stable for discharge home for uncomplicated diverticulitis         Amount and/or Complexity of Data Reviewed  Clinical lab tests: ordered and reviewed  Tests in the radiology section of CPT®: ordered and reviewed    Patient Progress  Patient progress: improved      Disposition  Final diagnoses:   Sigmoid diverticulitis   Dehydration     Time reflects when diagnosis was documented in both MDM as applicable and the Disposition within this note     Time User Action Codes Description Comment    1/4/2021 11:28 PM Belinda Bhagat Add [K57 32] Sigmoid diverticulitis     1/4/2021 11:28 PM Davonte GARCIA Add [R79 89] Elevated serum creatinine     1/4/2021 11:28 PM Lisette Angeles [R79 89] Elevated serum creatinine     1/4/2021 11:28 PM Ancelmo Sanchez [address]  [E86 0] Dehydration       ED Disposition     ED Disposition Condition Date/Time Comment    Discharge Stable Mon Jan 4, 2021 11:28 PM Sofia Rishabh discharge to home/self care  Follow-up Information    None         Patient's Medications   Discharge Prescriptions    CIPROFLOXACIN (CIPRO) 500 MG TABLET    Take 1 tablet (500 mg total) by mouth 2 (two) times a day for 7 days       Start Date: 1/4/2021  End Date: 1/11/2021       Order Dose: 500 mg       Quantity: 14 tablet    Refills: 0    HYDROCODONE-ACETAMINOPHEN (NORCO) 5-325 MG PER TABLET    Take 1 tablet by mouth every 6 (six) hours as needed for painMax Daily Amount: 4 tablets       Start Date: 1/4/2021  End Date: --       Order Dose: 1 tablet       Quantity: 12 tablet    Refills: 0    METRONIDAZOLE (FLAGYL) 500 MG TABLET    Take 1 tablet (500 mg total) by mouth every 8 (eight) hours for 7 days       Start Date: 1/4/2021  End Date: 1/11/2021       Order Dose: 500 mg       Quantity: 21 tablet    Refills: 0     No discharge procedures on file      PDMP Review       Value Time User    PDMP Reviewed  Yes 1/4/2021 11:29 PM Matthew Buckley MD          ED Provider  Electronically Signed by           Matthew Buckley MD  01/04/21 8846

## 2021-06-25 ENCOUNTER — OFFICE VISIT (OUTPATIENT)
Dept: FAMILY MEDICINE CLINIC | Facility: CLINIC | Age: 41
End: 2021-06-25
Payer: COMMERCIAL

## 2021-06-25 VITALS
DIASTOLIC BLOOD PRESSURE: 70 MMHG | BODY MASS INDEX: 24.05 KG/M2 | RESPIRATION RATE: 22 BRPM | SYSTOLIC BLOOD PRESSURE: 102 MMHG | HEIGHT: 70 IN | HEART RATE: 85 BPM | TEMPERATURE: 98.5 F | WEIGHT: 168 LBS | OXYGEN SATURATION: 94 %

## 2021-06-25 DIAGNOSIS — J45.21 MILD INTERMITTENT ASTHMA WITH ACUTE EXACERBATION: Primary | ICD-10-CM

## 2021-06-25 DIAGNOSIS — Z13.228 SCREENING FOR METABOLIC DISORDER: ICD-10-CM

## 2021-06-25 DIAGNOSIS — Z11.59 NEED FOR HEPATITIS C SCREENING TEST: ICD-10-CM

## 2021-06-25 PROBLEM — J45.31 MILD PERSISTENT ASTHMA WITH ACUTE EXACERBATION: Status: ACTIVE | Noted: 2021-06-25

## 2021-06-25 PROCEDURE — 3008F BODY MASS INDEX DOCD: CPT | Performed by: FAMILY MEDICINE

## 2021-06-25 PROCEDURE — 3725F SCREEN DEPRESSION PERFORMED: CPT | Performed by: FAMILY MEDICINE

## 2021-06-25 PROCEDURE — 99213 OFFICE O/P EST LOW 20 MIN: CPT | Performed by: FAMILY MEDICINE

## 2021-06-25 RX ORDER — DEXAMETHASONE 4 MG/1
2 TABLET ORAL 2 TIMES DAILY
Qty: 12 G | Refills: 5 | Status: SHIPPED | OUTPATIENT
Start: 2021-06-25 | End: 2022-03-23 | Stop reason: ALTCHOICE

## 2021-06-25 RX ORDER — MONTELUKAST SODIUM 10 MG/1
10 TABLET ORAL
Qty: 90 TABLET | Refills: 3 | Status: SHIPPED | OUTPATIENT
Start: 2021-06-25 | End: 2021-06-25 | Stop reason: SDUPTHER

## 2021-06-25 RX ORDER — PREDNISONE 20 MG/1
40 TABLET ORAL DAILY
Qty: 10 TABLET | Refills: 0 | Status: SHIPPED | OUTPATIENT
Start: 2021-06-25 | End: 2021-06-30

## 2021-06-25 RX ORDER — MONTELUKAST SODIUM 10 MG/1
10 TABLET ORAL
Qty: 90 TABLET | Refills: 3 | Status: SHIPPED | OUTPATIENT
Start: 2021-06-25 | End: 2022-03-23 | Stop reason: ALTCHOICE

## 2021-06-25 NOTE — PROGRESS NOTES
Assessment/Plan:     Diagnoses and all orders for this visit:    Mild intermittent asthma with acute exacerbation  -     fluticasone (Flovent HFA) 110 MCG/ACT inhaler; Inhale 2 puffs 2 (two) times a day Rinse mouth after use  -     predniSONE 20 mg tablet; Take 2 tablets (40 mg total) by mouth daily for 5 days  -     montelukast (SINGULAIR) 10 mg tablet; Take 1 tablet (10 mg total) by mouth daily at bedtime    Need for hepatitis C screening test  -     Hepatitis C Antibody ([facility]); Future    Screening for metabolic disorder  -     Basic metabolic panel; Future          Subjective:      Patient ID: Jenelle Teague is a 39 y o  male  Patient is here to assess his recent "asthma attack"    Asthma  Current regimen: albuterol  Recent albuterol use: multiple times only relieved sxs for 20 mins  Nighttime symptoms: Yesterday  Exacerbations in the past year: none  Coughing: Y  Wheezing: Y  Shortness of breath: N  Vomiting: N  Smoking: not recently 3-4 wks ago  Chest tightness: Y  Triggers: pollen, cat    Peak Exp Flow 100      The following portions of the patient's history were reviewed and updated as appropriate:   He  has a past medical history of Asthma, Diverticulitis, and Pneumothorax  He   Patient Active Problem List    Diagnosis Date Noted    Mild persistent asthma with acute exacerbation 06/25/2021     He  has a past surgical history that includes Chest tube insertion  His family history includes Arthritis in his mother; Asthma in his father, mother, and other; Colon cancer in his other; Diabetes in his other  He  reports that he has been smoking cigarettes  He has never used smokeless tobacco  He reports current alcohol use  He reports that he does not use drugs    Current Outpatient Medications   Medication Sig Dispense Refill    albuterol (2 5 mg/3 mL) 0 083 % nebulizer solution Take 2 5 mg by nebulization every 6 (six) hours as needed for wheezing      albuterol (PROVENTIL HFA,VENTOLIN HFA) 90 mcg/act inhaler Inhale 2 puffs every 6 (six) hours as needed for wheezing      montelukast (SINGULAIR) 10 mg tablet Take 1 tablet (10 mg total) by mouth daily at bedtime 90 tablet 3    fluticasone (Flovent HFA) 110 MCG/ACT inhaler Inhale 2 puffs 2 (two) times a day Rinse mouth after use  12 g 5    HYDROcodone-acetaminophen (NORCO) 5-325 mg per tablet Take 1 tablet by mouth every 6 (six) hours as needed for painMax Daily Amount: 4 tablets (Patient not taking: Reported on 6/25/2021) 12 tablet 0    predniSONE 20 mg tablet Take 2 tablets (40 mg total) by mouth daily for 5 days 10 tablet 0     No current facility-administered medications for this visit  Current Outpatient Medications on File Prior to Visit   Medication Sig    albuterol (2 5 mg/3 mL) 0 083 % nebulizer solution Take 2 5 mg by nebulization every 6 (six) hours as needed for wheezing    albuterol (PROVENTIL HFA,VENTOLIN HFA) 90 mcg/act inhaler Inhale 2 puffs every 6 (six) hours as needed for wheezing    [DISCONTINUED] montelukast (SINGULAIR) 10 mg tablet Take 1 tablet (10 mg total) by mouth daily at bedtime    HYDROcodone-acetaminophen (NORCO) 5-325 mg per tablet Take 1 tablet by mouth every 6 (six) hours as needed for painMax Daily Amount: 4 tablets (Patient not taking: Reported on 6/25/2021)     No current facility-administered medications on file prior to visit  He has No Known Allergies       Review of Systems   All other systems reviewed and are negative  Objective:      /70 (BP Location: Left arm, Patient Position: Sitting, Cuff Size: Adult)   Pulse 85   Temp 98 5 °F (36 9 °C) (Tympanic)   Resp 22   Ht 5' 10" (1 778 m)   Wt 76 2 kg (168 lb)   SpO2 94%   BMI 24 11 kg/m²          Physical Exam  Vitals reviewed  Constitutional:       General: He is not in acute distress  Appearance: Normal appearance  He is normal weight  He is not diaphoretic  HENT:      Head: Normocephalic        Nose: Nose normal  No congestion or rhinorrhea  Eyes:      Conjunctiva/sclera: Conjunctivae normal    Cardiovascular:      Rate and Rhythm: Normal rate  Pulses: Normal pulses  Pulmonary:      Effort: Tachypnea and prolonged expiration present  Breath sounds: Decreased air movement present  Wheezing present  Skin:     General: Skin is warm  Coloration: Skin is not pale  Neurological:      Mental Status: He is alert     Psychiatric:         Mood and Affect: Mood normal          Behavior: Behavior normal

## 2021-08-18 ENCOUNTER — TELEPHONE (OUTPATIENT)
Dept: GASTROENTEROLOGY | Facility: CLINIC | Age: 41
End: 2021-08-18

## 2021-09-28 ENCOUNTER — TELEPHONE (OUTPATIENT)
Dept: GASTROENTEROLOGY | Facility: CLINIC | Age: 41
End: 2021-09-28

## 2021-09-28 ENCOUNTER — OFFICE VISIT (OUTPATIENT)
Dept: GASTROENTEROLOGY | Facility: CLINIC | Age: 41
End: 2021-09-28
Payer: COMMERCIAL

## 2021-09-28 VITALS
SYSTOLIC BLOOD PRESSURE: 131 MMHG | BODY MASS INDEX: 24.88 KG/M2 | HEART RATE: 77 BPM | DIASTOLIC BLOOD PRESSURE: 72 MMHG | WEIGHT: 173.8 LBS | HEIGHT: 70 IN

## 2021-09-28 DIAGNOSIS — K21.9 GASTROESOPHAGEAL REFLUX DISEASE WITHOUT ESOPHAGITIS: Primary | ICD-10-CM

## 2021-09-28 DIAGNOSIS — K57.32 DIVERTICULITIS OF LARGE INTESTINE WITHOUT PERFORATION OR ABSCESS WITHOUT BLEEDING: ICD-10-CM

## 2021-09-28 PROCEDURE — 3008F BODY MASS INDEX DOCD: CPT | Performed by: INTERNAL MEDICINE

## 2021-09-28 PROCEDURE — 99204 OFFICE O/P NEW MOD 45 MIN: CPT | Performed by: INTERNAL MEDICINE

## 2021-09-28 RX ORDER — PANTOPRAZOLE SODIUM 40 MG/1
40 TABLET, DELAYED RELEASE ORAL DAILY
Qty: 90 TABLET | Refills: 1 | Status: SHIPPED | OUTPATIENT
Start: 2021-09-28 | End: 2022-05-05

## 2021-09-28 NOTE — PROGRESS NOTES
[facility] - Outpatient Consultation  Aishwarya Ornelas 39 y o  male MRN: [id number]  Encounter: [id number]        ASSESSMENT AND PLAN:      1  Gastroesophageal reflux disease without esophagitis   will start pantoprazole and plan to evaluate further with EGD  May have esophagitis or hiatal hernia  Will follow-up after the procedure to discuss the results and long-term plans  - pantoprazole (PROTONIX) 40 mg tablet; Take 1 tablet (40 mg total) by mouth daily  Dispense: 90 tablet; Refill: 1  - EGD; Future    2  Diverticulitis of large intestine without perforation or abscess without bleeding   his last colonoscopy was in December 2012  Will plan colonoscopy to re-evaluate  Will consider referral for surgical consultation to discuss possible elective hemicolectomy  Continue high-fiber diet  - Colonoscopy; Future    ______________________________________________________________________    HPI:   The patient with a history of GERD, previously on Dexilant a number of years ago but only using OTC antacids more recently has had significantly worsening symptoms over the past year or so with epigastric pain and mild dysphagia  Also has a history of recurrent diverticulitis, now status post at least 5 episodes with 2 hospitalizations  Most recent episode was in early 2021      REVIEW OF SYSTEMS:    Review of Systems   Gastrointestinal: Positive for bloating, abdominal pain, dysphagia and heartburn  Negative for change in bowel habit, hematemesis, hematochezia, jaundice, melena, nausea and vomiting  All other systems reviewed and are negative         Historical Information   Past Medical History:   Diagnosis Date    Asthma     Diverticulitis     Pneumothorax      Past Surgical History:   Procedure Laterality Date    CHEST TUBE INSERTION       Social History   Social History     Substance and Sexual Activity   Alcohol Use Yes    Comment: occasionally     Social History     Substance and Sexual Activity   Drug Use No     Social History     Tobacco Use   Smoking Status Current Some Day Smoker    Types: Cigarettes   Smokeless Tobacco Never Used   Tobacco Comment    occasionally     Family History   Problem Relation Age of Onset    Arthritis Mother     Asthma Mother     Asthma Father     Asthma Other     Diabetes Other     Colon cancer Paternal Uncle        Meds/Allergies       Current Outpatient Medications:     albuterol (2 5 mg/3 mL) 0 083 % nebulizer solution    albuterol (PROVENTIL HFA,VENTOLIN HFA) 90 mcg/act inhaler    fluticasone (Flovent HFA) 110 MCG/ACT inhaler    HYDROcodone-acetaminophen (NORCO) 5-325 mg per tablet    montelukast (SINGULAIR) 10 mg tablet    pantoprazole (PROTONIX) 40 mg tablet    No Known Allergies        Objective     Blood pressure 131/72, pulse 77, height 5' 10" (1 778 m), weight 78 8 kg (173 lb 12 8 oz)  Body mass index is 24 94 kg/m²  PHYSICAL EXAM:      Physical Exam  Vitals and nursing note reviewed  Constitutional:       General: He is not in acute distress  HENT:      Head: Normocephalic and atraumatic  Eyes:      General: No scleral icterus  Pupils: Pupils are equal, round, and reactive to light  Cardiovascular:      Rate and Rhythm: Normal rate and regular rhythm  Pulmonary:      Effort: Pulmonary effort is normal  No respiratory distress  Abdominal:      General: There is no distension  Palpations: Abdomen is soft  Tenderness: There is no abdominal tenderness  There is no guarding or rebound  Musculoskeletal:         General: Normal range of motion  Cervical back: Normal range of motion and neck supple  Skin:     General: Skin is warm and dry  Neurological:      General: No focal deficit present  Mental Status: He is alert and oriented to person, place, and time     Psychiatric:         Mood and Affect: Mood normal          Behavior: Behavior normal               Lab Results:   No visits with results within 1 Day(s) from this visit     Latest known visit with results is:   Admission on 01/04/2021, Discharged on 01/05/2021   Component Date Value    WBC 01/04/2021 10 70*    RBC 01/04/2021 4 74     Hemoglobin 01/04/2021 14 5     Hematocrit 01/04/2021 44 0     MCV 01/04/2021 93     MCH 01/04/2021 30 6     MCHC 01/04/2021 33 0     RDW 01/04/2021 13 2     MPV 01/04/2021 9 8     Platelets 75/37/5063 239     nRBC 01/04/2021 0     Neutrophils Relative 01/04/2021 74     Immat GRANS % 01/04/2021 0     Lymphocytes Relative 01/04/2021 18     Monocytes Relative 01/04/2021 7     Eosinophils Relative 01/04/2021 1     Basophils Relative 01/04/2021 0     Neutrophils Absolute 01/04/2021 7 87*    Immature Grans Absolute 01/04/2021 0 03     Lymphocytes Absolute 01/04/2021 1 92     Monocytes Absolute 01/04/2021 0 70     Eosinophils Absolute 01/04/2021 0 15     Basophils Absolute 01/04/2021 0 03     Sodium 01/04/2021 137     Potassium 01/04/2021 3 8     Chloride 01/04/2021 103     CO2 01/04/2021 26     ANION GAP 01/04/2021 8     BUN 01/04/2021 20     Creatinine 01/04/2021 1 46*    Glucose 01/04/2021 108     Calcium 01/04/2021 9 1     AST 01/04/2021 14     ALT 01/04/2021 26     Alkaline Phosphatase 01/04/2021 83     Total Protein 01/04/2021 7 7     Albumin 01/04/2021 4 0     Total Bilirubin 01/04/2021 0 38     eGFR 01/04/2021 59     Lipase 01/04/2021 91     Extra Tube 01/04/2021 hold for add ons     Color, UA 01/04/2021 Yellow     Clarity, UA 01/04/2021 Cloudy     pH, UA 01/04/2021 7 5     Leukocytes, UA 01/04/2021 Negative     Nitrite, UA 01/04/2021 Negative     Protein, UA 01/04/2021 Negative     Glucose, UA 01/04/2021 Negative     Ketones, UA 01/04/2021 Negative     Urobilinogen, UA 01/04/2021 1 0     Bilirubin, UA 01/04/2021 Negative     Blood, UA 01/04/2021 Moderate*    Specific [place], UA 01/04/2021 1 025     RBC, UA 01/04/2021 1-2     WBC, UA 01/04/2021 1-2     Epithelial Cells 01/04/2021 Occasional     Bacteria, UA 01/04/2021 None Seen     [facility] PHOSPATES 01/04/2021 Innumerable          Radiology Results:   No results found

## 2021-09-28 NOTE — TELEPHONE ENCOUNTER
I just wanted to let you know that we added this patient to Dr Mcgrath November' schedule for this Thursday the 30th

## 2021-09-29 ENCOUNTER — ANESTHESIA EVENT (OUTPATIENT)
Dept: GASTROENTEROLOGY | Facility: AMBULATORY SURGERY CENTER | Age: 41
End: 2021-09-29

## 2021-09-30 ENCOUNTER — ANESTHESIA (OUTPATIENT)
Dept: GASTROENTEROLOGY | Facility: AMBULATORY SURGERY CENTER | Age: 41
End: 2021-09-30

## 2021-09-30 ENCOUNTER — HOSPITAL ENCOUNTER (OUTPATIENT)
Dept: GASTROENTEROLOGY | Facility: AMBULATORY SURGERY CENTER | Age: 41
Discharge: HOME | End: 2021-09-30
Payer: COMMERCIAL

## 2021-09-30 VITALS
RESPIRATION RATE: 18 BRPM | DIASTOLIC BLOOD PRESSURE: 63 MMHG | BODY MASS INDEX: 24.77 KG/M2 | HEART RATE: 72 BPM | WEIGHT: 173 LBS | OXYGEN SATURATION: 100 % | TEMPERATURE: 98.3 F | SYSTOLIC BLOOD PRESSURE: 129 MMHG | HEIGHT: 70 IN

## 2021-09-30 DIAGNOSIS — K21.9 GASTROESOPHAGEAL REFLUX DISEASE WITHOUT ESOPHAGITIS: ICD-10-CM

## 2021-09-30 DIAGNOSIS — K57.32 DIVERTICULITIS OF LARGE INTESTINE WITHOUT PERFORATION OR ABSCESS WITHOUT BLEEDING: ICD-10-CM

## 2021-09-30 PROCEDURE — 43239 EGD BIOPSY SINGLE/MULTIPLE: CPT | Performed by: INTERNAL MEDICINE

## 2021-09-30 PROCEDURE — 45380 COLONOSCOPY AND BIOPSY: CPT | Performed by: INTERNAL MEDICINE

## 2021-09-30 PROCEDURE — 00813 ANES UPR LWR GI NDSC PX: CPT | Performed by: NURSE ANESTHETIST, CERTIFIED REGISTERED

## 2021-09-30 RX ORDER — SODIUM CHLORIDE 9 MG/ML
50 INJECTION, SOLUTION INTRAVENOUS CONTINUOUS
Status: DISCONTINUED | OUTPATIENT
Start: 2021-09-30 | End: 2021-10-04 | Stop reason: HOSPADM

## 2021-09-30 RX ORDER — LIDOCAINE HYDROCHLORIDE 10 MG/ML
INJECTION, SOLUTION EPIDURAL; INFILTRATION; INTRACAUDAL; PERINEURAL AS NEEDED
Status: DISCONTINUED | OUTPATIENT
Start: 2021-09-30 | End: 2021-09-30

## 2021-09-30 RX ORDER — CALCIUM CARBONATE 200(500)MG
1 TABLET,CHEWABLE ORAL DAILY
COMMUNITY
End: 2022-05-05

## 2021-09-30 RX ORDER — SODIUM CHLORIDE 9 MG/ML
20 INJECTION, SOLUTION INTRAVENOUS CONTINUOUS
Status: DISCONTINUED | OUTPATIENT
Start: 2021-09-30 | End: 2021-10-04 | Stop reason: HOSPADM

## 2021-09-30 RX ORDER — SODIUM CHLORIDE 9 MG/ML
30 INJECTION, SOLUTION INTRAVENOUS CONTINUOUS
Status: DISCONTINUED | OUTPATIENT
Start: 2021-09-30 | End: 2021-10-04 | Stop reason: HOSPADM

## 2021-09-30 RX ORDER — PROPOFOL 10 MG/ML
INJECTION, EMULSION INTRAVENOUS AS NEEDED
Status: DISCONTINUED | OUTPATIENT
Start: 2021-09-30 | End: 2021-09-30

## 2021-09-30 RX ORDER — SODIUM CHLORIDE 9 MG/ML
INJECTION, SOLUTION INTRAVENOUS CONTINUOUS PRN
Status: DISCONTINUED | OUTPATIENT
Start: 2021-09-30 | End: 2021-09-30

## 2021-09-30 RX ADMIN — PROPOFOL 20 MG: 10 INJECTION, EMULSION INTRAVENOUS at 14:10

## 2021-09-30 RX ADMIN — PROPOFOL 20 MG: 10 INJECTION, EMULSION INTRAVENOUS at 14:03

## 2021-09-30 RX ADMIN — PROPOFOL 120 MG: 10 INJECTION, EMULSION INTRAVENOUS at 14:00

## 2021-09-30 RX ADMIN — LIDOCAINE HYDROCHLORIDE 80 MG: 10 INJECTION, SOLUTION EPIDURAL; INFILTRATION; INTRACAUDAL; PERINEURAL at 14:00

## 2021-09-30 RX ADMIN — PROPOFOL 20 MG: 10 INJECTION, EMULSION INTRAVENOUS at 14:06

## 2021-09-30 RX ADMIN — SODIUM CHLORIDE: 9 INJECTION, SOLUTION INTRAVENOUS at 13:56

## 2021-09-30 RX ADMIN — PROPOFOL 20 MG: 10 INJECTION, EMULSION INTRAVENOUS at 14:18

## 2021-09-30 RX ADMIN — PROPOFOL 20 MG: 10 INJECTION, EMULSION INTRAVENOUS at 14:20

## 2021-09-30 RX ADMIN — PROPOFOL 20 MG: 10 INJECTION, EMULSION INTRAVENOUS at 14:16

## 2021-09-30 RX ADMIN — PROPOFOL 20 MG: 10 INJECTION, EMULSION INTRAVENOUS at 14:12

## 2021-09-30 RX ADMIN — PROPOFOL 30 MG: 10 INJECTION, EMULSION INTRAVENOUS at 14:01

## 2021-09-30 RX ADMIN — PROPOFOL 20 MG: 10 INJECTION, EMULSION INTRAVENOUS at 14:14

## 2021-09-30 RX ADMIN — PROPOFOL 40 MG: 10 INJECTION, EMULSION INTRAVENOUS at 14:08

## 2021-09-30 NOTE — ANESTHESIA POSTPROCEDURE EVALUATION
Post-Op Assessment Note    CV Status:  Stable  Pain Score: 0    Pain management: adequate     Mental Status:  Alert and awake   Hydration Status:  Euvolemic   PONV Controlled:  Controlled   Airway Patency:  Patent      Post Op Vitals Reviewed: Yes      Staff: CRNA         No complications documented      BP   87/55   Temp      Pulse  63   Resp   19   SpO2   98%

## 2021-09-30 NOTE — ANESTHESIA PREPROCEDURE EVALUATION
Procedure:  COLONOSCOPY  EGD    Relevant Problems   PULMONARY   (+) Mild persistent asthma with acute exacerbation        Physical Exam    Airway    Mallampati score: I  TM Distance: >3 FB  Neck ROM: full     Dental       Cardiovascular  Rhythm: regular, Rate: normal,     Pulmonary  Breath sounds clear to auscultation,     Other Findings        Anesthesia Plan  ASA Score- 1     Anesthesia Type- IV sedation with anesthesia with ASA Monitors  Additional Monitors:   Airway Plan:           Plan Factors-Exercise tolerance (METS): >4 METS  Chart reviewed  Patient summary reviewed  Patient is not a current smoker  Induction-     Postoperative Plan-     Informed Consent- Anesthetic plan and risks discussed with patient

## 2022-03-18 ENCOUNTER — OFFICE VISIT (OUTPATIENT)
Dept: FAMILY MEDICINE CLINIC | Facility: CLINIC | Age: 42
End: 2022-03-18
Payer: COMMERCIAL

## 2022-03-18 VITALS
DIASTOLIC BLOOD PRESSURE: 80 MMHG | HEART RATE: 90 BPM | BODY MASS INDEX: 25.77 KG/M2 | WEIGHT: 180 LBS | HEIGHT: 70 IN | TEMPERATURE: 98.4 F | SYSTOLIC BLOOD PRESSURE: 130 MMHG | OXYGEN SATURATION: 97 %

## 2022-03-18 DIAGNOSIS — R53.82 CHRONIC FATIGUE: Primary | ICD-10-CM

## 2022-03-18 DIAGNOSIS — Z13.220 SCREENING, LIPID: ICD-10-CM

## 2022-03-18 DIAGNOSIS — Z20.2 EXPOSURE TO STD: ICD-10-CM

## 2022-03-18 DIAGNOSIS — Z13.1 SCREENING FOR DIABETES MELLITUS: ICD-10-CM

## 2022-03-18 PROCEDURE — 99203 OFFICE O/P NEW LOW 30 MIN: CPT | Performed by: FAMILY MEDICINE

## 2022-03-18 NOTE — PROGRESS NOTES
Assessment/Plan:   Diagnoses and all orders for this visit:    Chronic fatigue  -     TSH, 3rd generation with Free T4 reflex; Future  -     Ambulatory Referral to Sleep Medicine; Future (patient high risk for SANDRO based on STOP-BANG screening)  -     Vitamin D 25 hydroxy; Future  -     CBC and differential; Future  -     Comprehensive metabolic panel; Future      Exposure to STD  -     HIV 1/2 Antigen/Antibody (4th Generation) w Reflex [facility]; Future  -     RPR; Future  -     Chronic Hepatitis Panel; Future  -     Chlamydia/GC amplified DNA by PCR; Future  -     Patient strongly advised to wear condoms with sexual intercourse  Screening, lipid  -     Lipid Panel with Direct LDL reflex; Future    Screening for diabetes mellitus  -     Comprehensive metabolic panel; Future    Return in about 2 weeks (around 4/1/2022) for Recheck, Annual physical   The patient indicates understanding of these issues and agrees with the plan  Subjective:      Patient ID: Mary Lou Davis is a 39 y o  male  HPI   Patient with past medical history of asthma as a child and diverticulitis presents to establish care  Patient reports he has been experiencing daytime fatigue for the past 2 months  Patient reports he sleeps well, however significant others have complained he snores very loudly  No witnessed apnea  Patient reports occasional headaches upon waking  Patient reports he has gained 20 pounds in the past 4 months despite eating healthily  He states he is however exercising less than normal  He denies cold or heat intolerance  Denies constipation or diarrhea  Denies palpitations or visual disturbances, but states he has been tremulous over the past few weeks which several friends have pointed this out  Patient has since stopped drinking coffee daily but has noticed not improvement in hand tremors  Patient also states he has received phone call from ex girlfriend recently who learned she has chlamydia   Patient reports he is asymptomatic from an STD stand point  He reports current girlfriend who he is in a monogamous relationship with is asymptomatic  Patient denies history of STDs  The following portions of the patient's history were reviewed and updated as appropriate: allergies, current medications, past family history, past medical history, past social history, past surgical history and problem list     Review of Systems   Constitutional: Positive for fatigue and unexpected weight change  Negative for activity change, appetite change, chills and fever  Eyes: Negative for visual disturbance  Respiratory: Negative for cough, chest tightness and shortness of breath  Cardiovascular: Negative for chest pain, palpitations and leg swelling  Gastrointestinal: Negative for constipation, diarrhea, nausea and vomiting  Endocrine: Negative for cold intolerance and heat intolerance  Genitourinary: Negative for difficulty urinating and dysuria  Neurological: Positive for tremors  Negative for dizziness, weakness, light-headedness and numbness  Objective:  /80   Pulse 90   Temp 98 4 °F (36 9 °C)   Ht 5' 10" (1 778 m)   Wt 81 6 kg (180 lb)   SpO2 97%   BMI 25 83 kg/m²          Physical Exam  Vitals reviewed  Constitutional:       General: He is not in acute distress  Appearance: Normal appearance  He is not ill-appearing, toxic-appearing or diaphoretic  HENT:      Head: Normocephalic and atraumatic  Eyes:      General: No scleral icterus  Right eye: No discharge  Left eye: No discharge  Extraocular Movements: Extraocular movements intact  Conjunctiva/sclera: Conjunctivae normal    Cardiovascular:      Rate and Rhythm: Normal rate and regular rhythm  Pulses: Normal pulses  Heart sounds: Normal heart sounds  Pulmonary:      Effort: Pulmonary effort is normal       Breath sounds: Normal breath sounds     Abdominal:      General: Bowel sounds are normal  There is no distension  Palpations: Abdomen is soft  Tenderness: There is no abdominal tenderness  Musculoskeletal:      Cervical back: Normal range of motion and neck supple  No tenderness  Right lower leg: No edema  Left lower leg: No edema  Skin:     General: Skin is warm and dry  Neurological:      Mental Status: He is alert and oriented to person, place, and time  Motor: No weakness        Deep Tendon Reflexes: Reflexes normal       Comments: Bilateral hands with intention tremor present      Psychiatric:         Mood and Affect: Mood normal          Behavior: Behavior normal

## 2022-03-21 ENCOUNTER — APPOINTMENT (OUTPATIENT)
Dept: LAB | Facility: CLINIC | Age: 42
End: 2022-03-21
Payer: COMMERCIAL

## 2022-03-21 DIAGNOSIS — R53.82 CHRONIC FATIGUE: Primary | ICD-10-CM

## 2022-03-21 DIAGNOSIS — Z20.2 EXPOSURE TO STD: ICD-10-CM

## 2022-03-21 LAB
25(OH)D3 SERPL-MCNC: 18.7 NG/ML (ref 30–100)
ALBUMIN SERPL BCP-MCNC: 3.7 G/DL (ref 3.5–5)
ALP SERPL-CCNC: 88 U/L (ref 46–116)
ALT SERPL W P-5'-P-CCNC: 39 U/L (ref 12–78)
ANION GAP SERPL CALCULATED.3IONS-SCNC: 4 MMOL/L (ref 4–13)
AST SERPL W P-5'-P-CCNC: 29 U/L (ref 5–45)
BASOPHILS # BLD AUTO: 0.03 THOUSANDS/ΜL (ref 0–0.1)
BASOPHILS NFR BLD AUTO: 1 % (ref 0–1)
BILIRUB SERPL-MCNC: 0.35 MG/DL (ref 0.2–1)
BUN SERPL-MCNC: 18 MG/DL (ref 5–25)
CALCIUM SERPL-MCNC: 8.9 MG/DL (ref 8.3–10.1)
CHLORIDE SERPL-SCNC: 107 MMOL/L (ref 100–108)
CHOLEST SERPL-MCNC: 254 MG/DL
CO2 SERPL-SCNC: 27 MMOL/L (ref 21–32)
CREAT SERPL-MCNC: 1.4 MG/DL (ref 0.6–1.3)
CRP SERPL QL: <3 MG/L
EOSINOPHIL # BLD AUTO: 0.24 THOUSAND/ΜL (ref 0–0.61)
EOSINOPHIL NFR BLD AUTO: 4 % (ref 0–6)
ERYTHROCYTE [DISTWIDTH] IN BLOOD BY AUTOMATED COUNT: 13.3 % (ref 11.6–15.1)
GFR SERPL CREATININE-BSD FRML MDRD: 61 ML/MIN/1.73SQ M
GLUCOSE P FAST SERPL-MCNC: 102 MG/DL (ref 65–99)
HBV CORE AB SER QL: NORMAL
HBV CORE IGM SER QL: NORMAL
HBV SURFACE AG SER QL: NORMAL
HCT VFR BLD AUTO: 44.9 % (ref 36.5–49.3)
HCV AB SER QL: NORMAL
HDLC SERPL-MCNC: 77 MG/DL
HGB BLD-MCNC: 15 G/DL (ref 12–17)
HIV 1+2 AB+HIV1 P24 AG SERPL QL IA: NORMAL
IMM GRANULOCYTES # BLD AUTO: 0.03 THOUSAND/UL (ref 0–0.2)
IMM GRANULOCYTES NFR BLD AUTO: 1 % (ref 0–2)
LDLC SERPL CALC-MCNC: 136 MG/DL (ref 0–100)
LYMPHOCYTES # BLD AUTO: 2.33 THOUSANDS/ΜL (ref 0.6–4.47)
LYMPHOCYTES NFR BLD AUTO: 42 % (ref 14–44)
MCH RBC QN AUTO: 32.1 PG (ref 26.8–34.3)
MCHC RBC AUTO-ENTMCNC: 33.4 G/DL (ref 31.4–37.4)
MCV RBC AUTO: 96 FL (ref 82–98)
MONOCYTES # BLD AUTO: 0.63 THOUSAND/ΜL (ref 0.17–1.22)
MONOCYTES NFR BLD AUTO: 11 % (ref 4–12)
NEUTROPHILS # BLD AUTO: 2.29 THOUSANDS/ΜL (ref 1.85–7.62)
NEUTS SEG NFR BLD AUTO: 41 % (ref 43–75)
NRBC BLD AUTO-RTO: 0 /100 WBCS
PLATELET # BLD AUTO: 224 THOUSANDS/UL (ref 149–390)
PMV BLD AUTO: 9.6 FL (ref 8.9–12.7)
POTASSIUM SERPL-SCNC: 4.1 MMOL/L (ref 3.5–5.3)
PROT SERPL-MCNC: 7.4 G/DL (ref 6.4–8.2)
RBC # BLD AUTO: 4.68 MILLION/UL (ref 3.88–5.62)
RPR SER QL: NORMAL
SODIUM SERPL-SCNC: 138 MMOL/L (ref 136–145)
TRIGL SERPL-MCNC: 204 MG/DL
TSH SERPL DL<=0.05 MIU/L-ACNC: 1.66 UIU/ML (ref 0.36–3.74)
WBC # BLD AUTO: 5.55 THOUSAND/UL (ref 4.31–10.16)

## 2022-03-21 PROCEDURE — 87491 CHLMYD TRACH DNA AMP PROBE: CPT

## 2022-03-21 PROCEDURE — 36415 COLL VENOUS BLD VENIPUNCTURE: CPT

## 2022-03-21 PROCEDURE — 86705 HEP B CORE ANTIBODY IGM: CPT

## 2022-03-21 PROCEDURE — 80061 LIPID PANEL: CPT

## 2022-03-21 PROCEDURE — 86140 C-REACTIVE PROTEIN: CPT

## 2022-03-21 PROCEDURE — 86592 SYPHILIS TEST NON-TREP QUAL: CPT

## 2022-03-21 PROCEDURE — 80053 COMPREHEN METABOLIC PANEL: CPT

## 2022-03-21 PROCEDURE — 86803 HEPATITIS C AB TEST: CPT

## 2022-03-21 PROCEDURE — 84443 ASSAY THYROID STIM HORMONE: CPT

## 2022-03-21 PROCEDURE — 87389 HIV-1 AG W/HIV-1&-2 AB AG IA: CPT

## 2022-03-21 PROCEDURE — 86704 HEP B CORE ANTIBODY TOTAL: CPT

## 2022-03-21 PROCEDURE — 87340 HEPATITIS B SURFACE AG IA: CPT

## 2022-03-21 PROCEDURE — 82306 VITAMIN D 25 HYDROXY: CPT

## 2022-03-21 PROCEDURE — 87591 N.GONORRHOEAE DNA AMP PROB: CPT

## 2022-03-21 PROCEDURE — 85025 COMPLETE CBC W/AUTO DIFF WBC: CPT

## 2022-03-22 LAB
C TRACH DNA SPEC QL NAA+PROBE: NEGATIVE
N GONORRHOEA DNA SPEC QL NAA+PROBE: NEGATIVE

## 2022-03-23 DIAGNOSIS — E55.9 VITAMIN D DEFICIENCY: Primary | ICD-10-CM

## 2022-03-23 DIAGNOSIS — N18.9 CHRONIC KIDNEY DISEASE, UNSPECIFIED CKD STAGE: ICD-10-CM

## 2022-03-23 RX ORDER — ERGOCALCIFEROL 1.25 MG/1
50000 CAPSULE ORAL WEEKLY
Qty: 8 CAPSULE | Refills: 0 | Status: SHIPPED | OUTPATIENT
Start: 2022-03-23 | End: 2022-05-12

## 2022-03-24 ENCOUNTER — TELEPHONE (OUTPATIENT)
Dept: NEPHROLOGY | Facility: CLINIC | Age: 42
End: 2022-03-24

## 2022-03-28 ENCOUNTER — TELEPHONE (OUTPATIENT)
Dept: NEPHROLOGY | Facility: CLINIC | Age: 42
End: 2022-03-28

## 2022-03-28 NOTE — TELEPHONE ENCOUNTER
New Patient Intake Form   Patient Details   Valentino Bee     1980     [id number]     Appointment Information   Who is calling to schedule? If not patient, what is callers name? [address]    Referring Provider  Dr Jerry Benites   Referring Provider Number [id number]   Reason for Appt (Diagnosis) CKD   Is patient aware of why they are being referred? yes   Does Patient have labs done at [place] [address]? If not, where do they go? yes    Has patient had labs / urine work done? List date of most recent lab / urine work yes    Has patient had a BMP & CBC done in the past 2 years? If so, list the date yes    Has patient been hospitalized recently? If yes, list name and location of hospital they were in no    Has patient been seen by a Nephrologist before? If yes, list name, location and phone number  no   Has patient been see by another Specialty before (ex  Neurology, urology, cardiology)? If yes, please list name, and specialty  no   Has the patient had imaging done? If so, list the most recent date and type of imaging yes    Does the patient has a stone analysis report if history of kidney stones? no   Appointment Details   Is there a referral on file?  yes    Appointment Date  5/5   Location [facility]    Miscellaneous

## 2022-04-01 ENCOUNTER — OFFICE VISIT (OUTPATIENT)
Dept: FAMILY MEDICINE CLINIC | Facility: CLINIC | Age: 42
End: 2022-04-01
Payer: COMMERCIAL

## 2022-04-01 VITALS
HEART RATE: 76 BPM | BODY MASS INDEX: 25.56 KG/M2 | RESPIRATION RATE: 22 BRPM | OXYGEN SATURATION: 96 % | WEIGHT: 178.5 LBS | DIASTOLIC BLOOD PRESSURE: 78 MMHG | SYSTOLIC BLOOD PRESSURE: 120 MMHG | TEMPERATURE: 97.8 F | HEIGHT: 70 IN

## 2022-04-01 DIAGNOSIS — R79.89 ELEVATED SERUM CREATININE: ICD-10-CM

## 2022-04-01 DIAGNOSIS — Z00.00 ANNUAL PHYSICAL EXAM: Primary | ICD-10-CM

## 2022-04-01 DIAGNOSIS — E55.9 VITAMIN D DEFICIENCY: ICD-10-CM

## 2022-04-01 PROCEDURE — 99396 PREV VISIT EST AGE 40-64: CPT | Performed by: FAMILY MEDICINE

## 2022-04-01 PROCEDURE — 3008F BODY MASS INDEX DOCD: CPT | Performed by: FAMILY MEDICINE

## 2022-04-01 NOTE — PATIENT INSTRUCTIONS

## 2022-04-01 NOTE — PROGRESS NOTES
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  Has been working on eating healthier  Patient also has elevation in creatinine, 1 40  He has an appointment scheduled with nephrology 2022  Tdap in      - Discussed lifestyle modifications for abnormal lipid panel  - Follow up with nephrology as scheduled  - Continue vitamin D supplementation    RTC in 3 months for follow up         Elevated serum creatinine          Immunizations and preventive care screenings were discussed with patient today  Appropriate education was printed on patient's after visit summary  Counseling:  Alcohol/drug use: discussed moderation in alcohol intake, the recommendations for healthy alcohol use, and avoidance of illicit drug use  Sexual health: discussed sexually transmitted diseases, partner selection, use of condoms, avoidance of unintended pregnancy, and contraceptive alternatives  · Exercise: the importance of regular exercise/physical activity was discussed  Recommend exercise 3-5 times per week for at least 30 minutes  BMI Counseling: Body mass index is 25 61 kg/m²  The BMI is above normal  Nutrition recommendations include encouraging healthy choices of fruits and vegetables, limiting drinks that contain sugar, moderation in carbohydrate intake and reducing intake of cholesterol  Exercise recommendations include moderate physical activity 150 minutes/week  No pharmacotherapy was ordered  Rationale for BMI follow-up plan is due to patient being overweight or obese  Tobacco Cessation Counseling: Tobacco cessation counseling was provided  The patient is sincerely urged to quit consumption of tobacco  He is not ready to quit tobacco  Medication options and side effects of medication discussed  Patient refused medication  Nutrition Assessment and Intervention:     Reviewed food recall journal      Physical Activity Assessment and Intervention:    Activity journal reviewed      Emotional and Mental Well-being, Sleep, Connectedness Assessment and Intervention:    Sleep/stress assessment performed      Tobacco and Toxic Substance Assessment and Intervention:     Tobacco use screening performed    Alcohol and drug use screening performed    Brief intervention performed for tobacco, alcohol, or drug use      Therapeutic Lifestyle Change Visit:     One-on-one comprehensive counseling, coaching, and health behavior change visit completed        Return in about 3 months (around 7/1/2022) for Follow up  Chief Complaint:     Chief Complaint   Patient presents with    Physical Exam      History of Present Illness:     Adult Annual Physical   Patient here for a comprehensive physical exam  The patient reports no problems  Diet and Physical Activity  · Diet/Nutrition: frequent junk food and high fat diet  · Exercise: moderate cardiovascular exercise and 3-4 times a week on average  Depression Screening  PHQ-2/9 Depression Screening         General Health  · Sleep: sleeps well and gets 4-6 hours of sleep on average  · Hearing: normal - bilateral   · Vision: no vision problems  · Dental: no dental visits for >1 year, brushes teeth once daily and flosses teeth occasionally   Health  · Symptoms include: none     Review of Systems:     Review of Systems   Constitutional: Negative for activity change, appetite change, chills, fatigue and fever     HENT: Negative for congestion, rhinorrhea, sneezing and sore throat  Respiratory: Negative for cough, chest tightness, shortness of breath and wheezing  Cardiovascular: Negative for chest pain and palpitations  Gastrointestinal: Negative for abdominal pain, constipation, diarrhea, nausea and vomiting  Genitourinary: Negative for dysuria and frequency  Musculoskeletal: Negative for arthralgias, gait problem, myalgias and neck pain  Skin: Negative for rash  Neurological: Negative for dizziness, weakness, numbness and headaches  Psychiatric/Behavioral: Negative for confusion  All other systems reviewed and are negative       Past Medical History:     Past Medical History:   Diagnosis Date    Asthma     9/30/21  Last attack was greater than one month ago; triggered by seasonal allergies    Diverticulitis     last attack of diverticulitis in January 2021    Pneumothorax       Past Surgical History:     Past Surgical History:   Procedure Laterality Date    CHEST TUBE INSERTION      COLONOSCOPY        Family History:     Family History   Problem Relation Age of Onset    Arthritis Mother     Asthma Mother     Asthma Father     Asthma Other     Diabetes Other     Colon cancer Paternal Uncle       Social History:     Social History     Socioeconomic History    Marital status: Single     Spouse name: Not on file    Number of children: Not on file    Years of education: Not on file    Highest education level: Not on file   Occupational History    Not on file   Tobacco Use    Smoking status: Current Some Day Smoker     Types: Cigarettes    Smokeless tobacco: Never Used    Tobacco comment: rarely--one every three months   Vaping Use    Vaping Use: Never used   Substance and Sexual Activity    Alcohol use: Yes     Comment: occasionally-3 beers/week    Drug use: No    Sexual activity: Not on file   Other Topics Concern    Not on file   Social History Narrative    Not on file     Social Determinants of Health     Financial Resource Strain: Not on file   Food Insecurity: Not on file   Transportation Needs: Not on file   Physical Activity: Not on file   Stress: Not on file   Social Connections: Not on file   Intimate Partner Violence: Not on file   Housing Stability: Not on file      Current Medications:     Current Outpatient Medications   Medication Sig Dispense Refill    albuterol (2 5 mg/3 mL) 0 083 % nebulizer solution Take 2 5 mg by nebulization every 6 (six) hours as needed for wheezing      albuterol (PROVENTIL HFA,VENTOLIN HFA) 90 mcg/act inhaler Inhale 2 puffs every 6 (six) hours as needed for wheezing      calcium carbonate (TUMS) 500 mg chewable tablet Chew 1 tablet daily (Patient not taking: Reported on 4/1/2022 )      ergocalciferol (VITAMIN D2) 50,000 units Take 1 capsule (50,000 Units total) by mouth once a week for 8 doses (Patient not taking: Reported on 4/1/2022 ) 8 capsule 0    pantoprazole (PROTONIX) 40 mg tablet Take 1 tablet (40 mg total) by mouth daily (Patient not taking: Reported on 4/1/2022 ) 90 tablet 1     No current facility-administered medications for this visit  Allergies:     No Known Allergies   Physical Exam:     /78 (BP Location: Left arm, Patient Position: Sitting, Cuff Size: Standard)   Pulse 76   Temp 97 8 °F (36 6 °C) (Temporal)   Resp 22   Ht 5' 10" (1 778 m)   Wt 81 kg (178 lb 8 oz)   SpO2 96%   BMI 25 61 kg/m²     Physical Exam  Vitals reviewed  Constitutional:       General: He is not in acute distress  Appearance: Normal appearance  He is well-developed  He is not toxic-appearing  HENT:      Head: Normocephalic and atraumatic  Nose: Nose normal  No congestion or rhinorrhea  Eyes:      General: No scleral icterus  Right eye: No discharge  Left eye: No discharge  Conjunctiva/sclera: Conjunctivae normal    Cardiovascular:      Rate and Rhythm: Normal rate and regular rhythm        Pulses: Normal pulses  Heart sounds: Normal heart sounds  No murmur heard  Pulmonary:      Effort: Pulmonary effort is normal  No respiratory distress  Breath sounds: Normal breath sounds  No wheezing  Abdominal:      General: Abdomen is flat  Bowel sounds are normal  There is no distension  Palpations: Abdomen is soft  Tenderness: There is no abdominal tenderness  Musculoskeletal:         General: No tenderness  Normal range of motion  Skin:     General: Skin is warm and dry  Findings: No erythema or rash  Neurological:      General: No focal deficit present  Mental Status: He is alert     Psychiatric:         Mood and Affect: Mood normal          Behavior: Behavior normal           Solange Santizo MD  [facility]

## 2022-04-01 NOTE — ASSESSMENT & PLAN NOTE
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

## 2022-04-26 PROBLEM — N18.2 STAGE 2 CHRONIC KIDNEY DISEASE: Status: ACTIVE | Noted: 2022-04-26

## 2022-04-26 PROBLEM — E78.5 DYSLIPIDEMIA: Status: ACTIVE | Noted: 2022-04-26

## 2022-04-26 NOTE — PROGRESS NOTES
Consultation - Nephrology 5/5/2022        History of Present Illness   Reason for Consult / Principal Problem:  CKD    HPI: Elo Taveras is a 39y o  year old male with a history of diverticulitis/asthma who we are asked to see regarding CKD    Patient denies any prior kidney disease  No history kidney stones/urinary tract infection/prostate problems/significant NSAID use  He denies any current dysuria hematuria voiding symptoms or foamy urine  He denies any significant lower extremity edema except for his right ankle which was painful, prior injury  He denies any other unusual skin rash or paresthesias  He does get occasional intermittent joint pains and muscle aches but he works with a [employer]  company    No history diabetes mellitus  History of hypertension    Pertinent labs  · Creatinine 2019 was 0 91  · Creatinine:  1 46 as of 01/04/2021; UA demonstrated moderate blood but only 1-2 RBCs and 1-2 WBCs and no proteinuria  · Creatinine 1 40 as of 03/21/2022  · Electrolytes all normal  · Glucose 102  · Calcium 8 9/total protein 7 4/albumin 3 7  · Lipid profile:  /HDL 77/triglycerides 204  · Vitamin-D 18 7  · Hemoglobin 15 0; otherwise normal CBC  · Negative hepatitis B/C  · Negative RPR  · Negative HIV        Review of systems:    General:  No fevers chills or recent illnesses  Good appetite in reasonably good energy  Weight is stable  Cardiovascular:  No chest pain or shortness of breath  Respiratory:  Dry cough, but no wheezing  Gastrointestinal:  No nausea vomiting diarrhea abdominal pain or bright red blood per rectum  Patient had a history of diverticulitis and had a colonoscopy/EGD 1 year ago    Genitourinary:  See HPI  Neurology:  No headaches, no dizziness or lightheadedness upon standing  All other systems were reviewed and are negative    Historical Information   Past Medical History:   Diagnosis Date    Asthma     9/30/21  Last attack was greater than one month ago; triggered by seasonal allergies    Diverticulitis     last attack of diverticulitis in January 2021    Pneumothorax    · No history of CAD/CHF/liver disease/lung disease/CVA or seizures/thyroid disease/diabetes mellitus/cancer    Past Surgical History:   Procedure Laterality Date    CHEST TUBE INSERTION      COLONOSCOPY       Social History   Social History     Substance and Sexual Activity   Alcohol Use Yes    Comment: occasionally-3 beers/week     Social History     Substance and Sexual Activity   Drug Use No     Social History     Tobacco Use   Smoking Status Current Some Day Smoker    Types: Cigarettes   Smokeless Tobacco Never Used   Tobacco Comment    rarely--one every three months   · Tries to avoid salt of possible  · Dedicated exercise at least 3 days a week    Family History   Problem Relation Age of Onset    Arthritis Mother     Asthma Mother     Asthma Father     Asthma Other     Diabetes Other     Colon cancer Paternal Uncle    · One uncle on father's side had end-stage kidney disease unclear etiology may be related to colon cancer  · Father's side has hypertension but not his father    Meds/Allergies   all current active meds have been reviewed, current meds:   Current Outpatient Medications:     albuterol (PROVENTIL HFA,VENTOLIN HFA) 90 mcg/act inhaler, Inhale 2 puffs every 6 (six) hours as needed for wheezing, Disp: , Rfl:     ASHWAGANDHA PO, Take by mouth in the morning, Disp: , Rfl:     ergocalciferol (VITAMIN D2) 50,000 units, Take 1 capsule (50,000 Units total) by mouth once a week for 8 doses, Disp: 8 capsule, Rfl: 0    montelukast (Singulair) 10 mg tablet, Take 10 mg by mouth daily at bedtime, Disp: , Rfl:     albuterol (2 5 mg/3 mL) 0 083 % nebulizer solution, Take 2 5 mg by nebulization every 6 (six) hours as needed for wheezing (Patient not taking: Reported on 5/5/2022 ), Disp: , Rfl:     No Known Allergies    Objective   Vitals:    05/05/22 0847   BP: 116/80   Pulse: 78   BP sitting on right:  110/80 with a heart rate of 76 and regular  BP sitting on left:  108/78 with a heart rate of 76 and regular  BP standing on left:  112/82 with a heart rate of 80 and regular  Body mass index is 25 25 kg/m²  General:  Well-developed well-nourished no acute distress  Skin:  No acute rash  Eyes:  No scleral icterus, noninjected, conjunctiva appear normal, no discharge from the eyes  ENT:  Normocephalic/atraumatic, mucous membranes moist, tongue appears normal size  Neck:  Supple, no jugular venous distention, 2+ carotid upstroke, no carotid bruits; trachea is midline, no thyromegaly; no lymphadenopathy  Back:  No CVA tenderness, spine appears midline without any overt abnormalities  Chest:  Clear to auscultation and percussion, good respiratory effort, no use of accessory respiratory muscles  CVS:  Regular rate and rhythm without a murmur rub or gallops appreciable  Abdomen/gastrointestinal:  Normal bowel sounds, nontender and nondistended without hepatosplenomegaly or bruits; no masses appreciable  Extremities:  No clubbing, no cyanosis, no edema, 2+ dorsalis pedis pulses, no femoral bruits, no arthritic changes and full range of motion  Neuro:  No gross focality  Psych:  Alert, oriented and appropriate    Current Weight:   Weight (last 2 days)     Date/Time Weight    05/05/22 0847 79 8 (176)            Lab Results:  I have personally reviewed pertinent labs    Results for orders placed or performed in visit on 05/05/22   POCT urine dip   Result Value Ref Range    LEUKOCYTE ESTERASE,UA neg     NITRITE,UA neg     SL AMB POCT UROBILINOGEN 0 2     POCT URINE PROTEIN neg      PH,UA 6     BLOOD,UA hemolyzed trace     SPECIFIC GRAVITY,UA 1 020     KETONES,UA neg     BILIRUBIN,UA neg     GLUCOSE, UA neg      Microscopic exam by myself today personally:  No cells casts or crystals seen          ASSESSMENT AND PLAN:  39y o  year old male with a history of diverticulitis/asthma who we are asked to see regarding CKD    1  CKD stage 2     Etiology:? Hypertensive nephrosclerosis/arteriolar nephrosclerosis although young age  Certainly rule out obstructive uropathy  Rule out primary glomerular process however UA was bland in January 2021  ? Chronic interstitial nephritis  Depending upon the 24 hour urine creatinine clearance and follow-up labs we may want to consider a kidney biopsy given young age   Baseline creatinine:  1 40  Recommend:   Workup:  o Follow-up chemistry  o UA with microscopic  o Urine protein creatinine ratio  o Mineral bone disorder evaluation from CKD including magnesium/phosphorus/PTH intact: Patient receiving vitamin-D supplementation  o CBC: Normal please see above  o Lipid profile:  See above patient is pushing diet and exercise  o I would do a 24 hour urine for creatinine clearance/proteinuria  o Renal ultrasound with PVR  o Renal artery duplex to rule out renal artery stenosis  o ? Kidney biopsy depending upon the above evaluation     Treatment:  o Treat hypertension, please see below for recommendations  o Treat dyslipidemia  o Avoid nephrotoxic agents such as NSAIDs, and proton pump inhibitors as able; patient counseled as such  o Good overall health recommendations including weight loss as appropriate, isotonic exercise as able, and avoidance of salt; patient counseled as such  I would also avoid any supplements at this time to make sure this is not affecting his kidney function in any fashion  Discussed with the patient and counseled as such  Further workup and treatment recommendations will be forthcoming depending upon the above results and course    2  Hypertension:   Goal:  Less than 130/80 given CKD   Push nonmedical regimen as outlined above   Medication changes today:  Currently blood pressure is quite good  I would recommend purchasing a blood pressure machine in taking a week a readings at this time      3  Other problems:   Diverticulitis   Asthma      Patient Instructions   1  Medication changes today:   No medication changes today   Would recommend stopping the supplement your taking Mary Lou at this time    2  Please go for  fasting  lab work at this time perhaps 1-2 weeks after stopping her above supplement, this will also include a 24 hour urine collection to be done at the same time as the lab work    3  Please go for an ultrasound of your kidneys at this time  4  Please go for an ultrasound of your kidney arteries at this time     5  Please purchase an Omron blood pressure machine:  Please take 1 week a blood pressure readings  at this time     AS FOLLOWS  MORNING AND EVENING, SITTING AND STANDING as follows:  · TAKE THE MORNING READINGS BEFORE ANY MEDICATIONS AND WHEN YOU ARE RELAXED FOR SEVERAL MINUTES  · TAKE THE EVENING READINGS:  BETWEEN 7-10 P M ; PRIOR TO ANY MEDICATIONS; AT LEAST IN OUR  FROM DINNER; AND CERTAINLY AFTER RELAXING FOR A FEW MINUTES  · PLEASE INCLUDE HEART RATE WITH YOUR BLOOD PRESSURE READINGS  · When taking standing readings, keep your arm supported at heart level and not dangling  · Make sure you are sitting with your back supported and feet on the ground and do not cross your legs or feet  · Make sure you have not taken any coffee or caffeine products or exercised or smoke cigarettes at least 30 minutes before taking your blood pressure  Then please mail these readings into the office    6  Follow-up in 4  months   Please bring in 1 week a blood pressure readings morning evening, sitting and standing is outlined above   PLEASE BRING AN YOUR BLOOD PRESSURE MACHINE TO CORRELATE WITH THE OFFICE MACHINE AT THIS NEXT SCHEDULED VISIT   Please go for fasting lab work 1-2 weeks prior to your appointment      7   General instructions:   AVOID SALT BUT NOT ADDING AN READING LABELS TO MAKE SURE THERE IS LOW-SALT IN THE FOOD THAT YOU ARE EATING  o Goal is less than 2 g of sodium intake or less than 5 g of sodium chloride intake per day     Avoid nonsteroidal anti-inflammatory drugs such as Naprosyn, ibuprofen, Aleve, Advil, Celebrex, Meloxicam (Mobic) etc   You can use Tylenol as needed if you do not have any liver condition to be concerned about     Avoid medications such as Sudafed or decongestants and antihistamines that contained the D component which is the decongestant  You can take antihistamines without the decongestant or D component   Try to avoid medications such as pantoprazole or  Protonix/Nexium or Esomeprazole)/Prilosec or omeprazole/Prevacid or lansoprazole/AcipHex or Rabeprazole  If you are able to, use Pepcid as this is safer for your kidneys   Try to exercise at least 30 minutes 3 days a week to begin with with an ultimate goal of 5 days a week for at least 30 minutes     Try to lose 5-10 lb by your next visit     Please do not drink more than 2 glasses of alcohol/wine on a daily basis as this may contribute to your high blood pressure  Portions of the record may have been created with voice recognition software  Occasional wrong word or "sound a like" substitutions may have occurred due to the inherent limitations of voice recognition software  Read the chart carefully and recognize, using context, where substitutions have occurred      Dean Muñiz MD

## 2022-05-05 ENCOUNTER — CONSULT (OUTPATIENT)
Dept: NEPHROLOGY | Facility: CLINIC | Age: 42
End: 2022-05-05
Payer: COMMERCIAL

## 2022-05-05 VITALS
SYSTOLIC BLOOD PRESSURE: 116 MMHG | DIASTOLIC BLOOD PRESSURE: 80 MMHG | WEIGHT: 176 LBS | HEART RATE: 78 BPM | HEIGHT: 70 IN | BODY MASS INDEX: 25.2 KG/M2

## 2022-05-05 DIAGNOSIS — N18.9 CHRONIC KIDNEY DISEASE, UNSPECIFIED CKD STAGE: ICD-10-CM

## 2022-05-05 DIAGNOSIS — E55.9 VITAMIN D DEFICIENCY: ICD-10-CM

## 2022-05-05 DIAGNOSIS — E78.5 DYSLIPIDEMIA: ICD-10-CM

## 2022-05-05 DIAGNOSIS — R79.89 ELEVATED SERUM CREATININE: ICD-10-CM

## 2022-05-05 DIAGNOSIS — N18.2 STAGE 2 CHRONIC KIDNEY DISEASE: Primary | ICD-10-CM

## 2022-05-05 LAB
SL AMB  POCT GLUCOSE, UA: NORMAL
SL AMB LEUKOCYTE ESTERASE,UA: NORMAL
SL AMB POCT BILIRUBIN,UA: NORMAL
SL AMB POCT BLOOD,UA: NORMAL
SL AMB POCT KETONES,UA: NORMAL
SL AMB POCT NITRITE,UA: NORMAL
SL AMB POCT PH,UA: 6
SL AMB POCT SPECIFIC GRAVITY,UA: 1.02
SL AMB POCT URINE PROTEIN: NORMAL
SL AMB POCT UROBILINOGEN: 0.2

## 2022-05-05 PROCEDURE — 3008F BODY MASS INDEX DOCD: CPT | Performed by: INTERNAL MEDICINE

## 2022-05-05 PROCEDURE — 81002 URINALYSIS NONAUTO W/O SCOPE: CPT | Performed by: INTERNAL MEDICINE

## 2022-05-05 PROCEDURE — 99204 OFFICE O/P NEW MOD 45 MIN: CPT | Performed by: INTERNAL MEDICINE

## 2022-05-05 PROCEDURE — 4004F PT TOBACCO SCREEN RCVD TLK: CPT | Performed by: INTERNAL MEDICINE

## 2022-05-05 RX ORDER — MONTELUKAST SODIUM 10 MG/1
10 TABLET ORAL
COMMUNITY

## 2022-05-05 NOTE — PATIENT INSTRUCTIONS
1  Medication changes today:   No medication changes today   Would recommend stopping the supplement your taking Mary Lou at this time    2  Please go for  fasting  lab work at this time perhaps 1-2 weeks after stopping her above supplement, this will also include a 24 hour urine collection to be done at the same time as the lab work    3  Please go for an ultrasound of your kidneys at this time  4  Please go for an ultrasound of your kidney arteries at this time     5  Please purchase an Omron blood pressure machine:  Please take 1 week a blood pressure readings  at this time     AS FOLLOWS  MORNING AND EVENING, SITTING AND STANDING as follows:  · TAKE THE MORNING READINGS BEFORE ANY MEDICATIONS AND WHEN YOU ARE RELAXED FOR SEVERAL MINUTES  · TAKE THE EVENING READINGS:  BETWEEN 7-10 P M ; PRIOR TO ANY MEDICATIONS; AT LEAST IN OUR  FROM DINNER; AND CERTAINLY AFTER RELAXING FOR A FEW MINUTES  · PLEASE INCLUDE HEART RATE WITH YOUR BLOOD PRESSURE READINGS  · When taking standing readings, keep your arm supported at heart level and not dangling  · Make sure you are sitting with your back supported and feet on the ground and do not cross your legs or feet  · Make sure you have not taken any coffee or caffeine products or exercised or smoke cigarettes at least 30 minutes before taking your blood pressure  Then please mail these readings into the office    6  Follow-up in 4  months   Please bring in 1 week a blood pressure readings morning evening, sitting and standing is outlined above   PLEASE BRING AN YOUR BLOOD PRESSURE MACHINE TO CORRELATE WITH THE OFFICE MACHINE AT THIS NEXT SCHEDULED VISIT   Please go for fasting lab work 1-2 weeks prior to your appointment      7   General instructions:   AVOID SALT BUT NOT ADDING AN READING LABELS TO MAKE SURE THERE IS LOW-SALT IN THE FOOD THAT YOU ARE EATING  o Goal is less than 2 g of sodium intake or less than 5 g of sodium chloride intake per day     Avoid nonsteroidal anti-inflammatory drugs such as Naprosyn, ibuprofen, Aleve, Advil, Celebrex, Meloxicam (Mobic) etc   You can use Tylenol as needed if you do not have any liver condition to be concerned about     Avoid medications such as Sudafed or decongestants and antihistamines that contained the D component which is the decongestant  You can take antihistamines without the decongestant or D component   Try to avoid medications such as pantoprazole or  Protonix/Nexium or Esomeprazole)/Prilosec or omeprazole/Prevacid or lansoprazole/AcipHex or Rabeprazole  If you are able to, use Pepcid as this is safer for your kidneys   Try to exercise at least 30 minutes 3 days a week to begin with with an ultimate goal of 5 days a week for at least 30 minutes     Try to lose 5-10 lb by your next visit     Please do not drink more than 2 glasses of alcohol/wine on a daily basis as this may contribute to your high blood pressure

## 2022-09-02 ENCOUNTER — TELEPHONE (OUTPATIENT)
Dept: NEPHROLOGY | Facility: CLINIC | Age: 42
End: 2022-09-02

## 2022-09-02 NOTE — TELEPHONE ENCOUNTER
Spoke with patient about going for lab work before his appt on 9/8  He said he is meeting with his insurance company at the end of the week and will call back after he speaks with them

## 2024-02-22 ENCOUNTER — HOSPITAL ENCOUNTER (INPATIENT)
Facility: HOSPITAL | Age: 44
LOS: 7 days | Discharge: HOME | DRG: 145 | End: 2024-02-29
Attending: EMERGENCY MEDICINE | Admitting: STUDENT IN AN ORGANIZED HEALTH CARE EDUCATION/TRAINING PROGRAM
Payer: COMMERCIAL

## 2024-02-22 ENCOUNTER — APPOINTMENT (EMERGENCY)
Dept: RADIOLOGY | Facility: HOSPITAL | Age: 44
DRG: 145 | End: 2024-02-22
Payer: COMMERCIAL

## 2024-02-22 DIAGNOSIS — J45.52 SEVERE PERSISTENT ASTHMA WITH STATUS ASTHMATICUS: Primary | ICD-10-CM

## 2024-02-22 DIAGNOSIS — J45.31 MILD PERSISTENT ASTHMA WITH ACUTE EXACERBATION: ICD-10-CM

## 2024-02-22 PROBLEM — E87.6 ACUTE HYPOKALEMIA: Status: ACTIVE | Noted: 2024-02-22

## 2024-02-22 PROBLEM — D72.829 LEUKOCYTOSIS: Status: ACTIVE | Noted: 2024-02-22

## 2024-02-22 LAB
2HR DELTA HS TROPONIN: -1 NG/L
4HR DELTA HS TROPONIN: -2 NG/L
ANION GAP SERPL CALCULATED.3IONS-SCNC: 11 MMOL/L
ANION GAP SERPL CALCULATED.3IONS-SCNC: 14 MMOL/L
BASOPHILS # BLD AUTO: 0.05 THOUSANDS/ÂΜL (ref 0–0.1)
BASOPHILS NFR BLD AUTO: 1 % (ref 0–1)
BUN SERPL-MCNC: 14 MG/DL (ref 5–25)
BUN SERPL-MCNC: 14 MG/DL (ref 5–25)
CALCIUM SERPL-MCNC: 9.3 MG/DL (ref 8.4–10.2)
CALCIUM SERPL-MCNC: 9.8 MG/DL (ref 8.4–10.2)
CARDIAC TROPONIN I PNL SERPL HS: 4 NG/L
CARDIAC TROPONIN I PNL SERPL HS: 5 NG/L
CARDIAC TROPONIN I PNL SERPL HS: 6 NG/L
CHLORIDE SERPL-SCNC: 103 MMOL/L (ref 96–108)
CHLORIDE SERPL-SCNC: 104 MMOL/L (ref 96–108)
CO2 SERPL-SCNC: 18 MMOL/L (ref 21–32)
CO2 SERPL-SCNC: 25 MMOL/L (ref 21–32)
CREAT SERPL-MCNC: 0.86 MG/DL (ref 0.6–1.3)
CREAT SERPL-MCNC: 0.93 MG/DL (ref 0.6–1.3)
EOSINOPHIL # BLD AUTO: 0.2 THOUSAND/ÂΜL (ref 0–0.61)
EOSINOPHIL NFR BLD AUTO: 2 % (ref 0–6)
ERYTHROCYTE [DISTWIDTH] IN BLOOD BY AUTOMATED COUNT: 12.7 % (ref 11.6–15.1)
FLUAV RNA RESP QL NAA+PROBE: NEGATIVE
FLUBV RNA RESP QL NAA+PROBE: NEGATIVE
GFR SERPL CREATININE-BSD FRML MDRD: 100 ML/MIN/1.73SQ M
GFR SERPL CREATININE-BSD FRML MDRD: 106 ML/MIN/1.73SQ M
GLUCOSE SERPL-MCNC: 126 MG/DL (ref 65–140)
GLUCOSE SERPL-MCNC: 166 MG/DL (ref 65–140)
HCT VFR BLD AUTO: 45.3 % (ref 36.5–49.3)
HGB BLD-MCNC: 15.5 G/DL (ref 12–17)
IMM GRANULOCYTES # BLD AUTO: 0.02 THOUSAND/UL (ref 0–0.2)
IMM GRANULOCYTES NFR BLD AUTO: 0 % (ref 0–2)
LACTATE SERPL-SCNC: 1.1 MMOL/L (ref 0.5–2)
LYMPHOCYTES # BLD AUTO: 2.53 THOUSANDS/ÂΜL (ref 0.6–4.47)
LYMPHOCYTES NFR BLD AUTO: 23 % (ref 14–44)
MCH RBC QN AUTO: 31.1 PG (ref 26.8–34.3)
MCHC RBC AUTO-ENTMCNC: 34.2 G/DL (ref 31.4–37.4)
MCV RBC AUTO: 91 FL (ref 82–98)
MONOCYTES # BLD AUTO: 0.67 THOUSAND/ÂΜL (ref 0.17–1.22)
MONOCYTES NFR BLD AUTO: 6 % (ref 4–12)
NEUTROPHILS # BLD AUTO: 7.36 THOUSANDS/ÂΜL (ref 1.85–7.62)
NEUTS SEG NFR BLD AUTO: 68 % (ref 43–75)
NRBC BLD AUTO-RTO: 0 /100 WBCS
PLATELET # BLD AUTO: 204 THOUSANDS/UL (ref 149–390)
PLATELET # BLD AUTO: 251 THOUSANDS/UL (ref 149–390)
PMV BLD AUTO: 10 FL (ref 8.9–12.7)
PMV BLD AUTO: 10.1 FL (ref 8.9–12.7)
POTASSIUM SERPL-SCNC: 3.4 MMOL/L (ref 3.5–5.3)
POTASSIUM SERPL-SCNC: 3.5 MMOL/L (ref 3.5–5.3)
PROCALCITONIN SERPL-MCNC: <0.05 NG/ML
RBC # BLD AUTO: 4.98 MILLION/UL (ref 3.88–5.62)
RSV RNA RESP QL NAA+PROBE: NEGATIVE
SARS-COV-2 RNA RESP QL NAA+PROBE: NEGATIVE
SODIUM SERPL-SCNC: 136 MMOL/L (ref 135–147)
SODIUM SERPL-SCNC: 139 MMOL/L (ref 135–147)
WBC # BLD AUTO: 10.83 THOUSAND/UL (ref 4.31–10.16)

## 2024-02-22 PROCEDURE — 83605 ASSAY OF LACTIC ACID: CPT | Performed by: EMERGENCY MEDICINE

## 2024-02-22 PROCEDURE — 36415 COLL VENOUS BLD VENIPUNCTURE: CPT | Performed by: EMERGENCY MEDICINE

## 2024-02-22 PROCEDURE — 71045 X-RAY EXAM CHEST 1 VIEW: CPT

## 2024-02-22 PROCEDURE — 93005 ELECTROCARDIOGRAM TRACING: CPT

## 2024-02-22 PROCEDURE — 84484 ASSAY OF TROPONIN QUANT: CPT | Performed by: EMERGENCY MEDICINE

## 2024-02-22 PROCEDURE — 94644 CONT INHLJ TX 1ST HOUR: CPT

## 2024-02-22 PROCEDURE — 99291 CRITICAL CARE FIRST HOUR: CPT | Performed by: EMERGENCY MEDICINE

## 2024-02-22 PROCEDURE — 85025 COMPLETE CBC W/AUTO DIFF WBC: CPT | Performed by: EMERGENCY MEDICINE

## 2024-02-22 PROCEDURE — 85049 AUTOMATED PLATELET COUNT: CPT

## 2024-02-22 PROCEDURE — 96375 TX/PRO/DX INJ NEW DRUG ADDON: CPT

## 2024-02-22 PROCEDURE — 94640 AIRWAY INHALATION TREATMENT: CPT

## 2024-02-22 PROCEDURE — 96374 THER/PROPH/DIAG INJ IV PUSH: CPT

## 2024-02-22 PROCEDURE — 94760 N-INVAS EAR/PLS OXIMETRY 1: CPT

## 2024-02-22 PROCEDURE — 80048 BASIC METABOLIC PNL TOTAL CA: CPT

## 2024-02-22 PROCEDURE — 0241U HB NFCT DS VIR RESP RNA 4 TRGT: CPT | Performed by: EMERGENCY MEDICINE

## 2024-02-22 PROCEDURE — 84145 PROCALCITONIN (PCT): CPT | Performed by: EMERGENCY MEDICINE

## 2024-02-22 PROCEDURE — 99255 IP/OBS CONSLTJ NEW/EST HI 80: CPT | Performed by: INTERNAL MEDICINE

## 2024-02-22 PROCEDURE — 99285 EMERGENCY DEPT VISIT HI MDM: CPT

## 2024-02-22 PROCEDURE — 99223 1ST HOSP IP/OBS HIGH 75: CPT | Performed by: STUDENT IN AN ORGANIZED HEALTH CARE EDUCATION/TRAINING PROGRAM

## 2024-02-22 PROCEDURE — 80048 BASIC METABOLIC PNL TOTAL CA: CPT | Performed by: EMERGENCY MEDICINE

## 2024-02-22 RX ORDER — LEVALBUTEROL INHALATION SOLUTION 1.25 MG/3ML
1.25 SOLUTION RESPIRATORY (INHALATION)
Status: DISCONTINUED | OUTPATIENT
Start: 2024-02-22 | End: 2024-02-29 | Stop reason: HOSPADM

## 2024-02-22 RX ORDER — ALBUTEROL SULFATE 2.5 MG/3ML
2.5 SOLUTION RESPIRATORY (INHALATION) EVERY 4 HOURS PRN
Status: DISCONTINUED | OUTPATIENT
Start: 2024-02-22 | End: 2024-02-29 | Stop reason: HOSPADM

## 2024-02-22 RX ORDER — ALBUTEROL SULFATE 2.5 MG/3ML
5 SOLUTION RESPIRATORY (INHALATION) ONCE
Status: COMPLETED | OUTPATIENT
Start: 2024-02-22 | End: 2024-02-22

## 2024-02-22 RX ORDER — METHYLPREDNISOLONE SODIUM SUCCINATE 40 MG/ML
40 INJECTION, POWDER, LYOPHILIZED, FOR SOLUTION INTRAMUSCULAR; INTRAVENOUS EVERY 6 HOURS SCHEDULED
Status: DISCONTINUED | OUTPATIENT
Start: 2024-02-22 | End: 2024-02-22

## 2024-02-22 RX ORDER — HEPARIN SODIUM 5000 [USP'U]/ML
5000 INJECTION, SOLUTION INTRAVENOUS; SUBCUTANEOUS EVERY 8 HOURS SCHEDULED
Status: DISCONTINUED | OUTPATIENT
Start: 2024-02-22 | End: 2024-02-29 | Stop reason: HOSPADM

## 2024-02-22 RX ORDER — OXYCODONE HYDROCHLORIDE 5 MG/1
5 TABLET ORAL EVERY 6 HOURS PRN
Status: DISCONTINUED | OUTPATIENT
Start: 2024-02-22 | End: 2024-02-29 | Stop reason: HOSPADM

## 2024-02-22 RX ORDER — METHYLPREDNISOLONE SODIUM SUCCINATE 125 MG/2ML
125 INJECTION, POWDER, LYOPHILIZED, FOR SOLUTION INTRAMUSCULAR; INTRAVENOUS ONCE
Status: COMPLETED | OUTPATIENT
Start: 2024-02-22 | End: 2024-02-22

## 2024-02-22 RX ORDER — MAGNESIUM SULFATE HEPTAHYDRATE 40 MG/ML
2 INJECTION, SOLUTION INTRAVENOUS ONCE
Status: COMPLETED | OUTPATIENT
Start: 2024-02-22 | End: 2024-02-22

## 2024-02-22 RX ORDER — ACETAMINOPHEN 325 MG/1
650 TABLET ORAL EVERY 6 HOURS PRN
Status: DISCONTINUED | OUTPATIENT
Start: 2024-02-22 | End: 2024-02-29 | Stop reason: HOSPADM

## 2024-02-22 RX ORDER — SODIUM CHLORIDE FOR INHALATION 0.9 %
12 VIAL, NEBULIZER (ML) INHALATION ONCE
Status: COMPLETED | OUTPATIENT
Start: 2024-02-22 | End: 2024-02-22

## 2024-02-22 RX ORDER — PREDNISONE 20 MG/1
40 TABLET ORAL DAILY
Status: DISCONTINUED | OUTPATIENT
Start: 2024-02-23 | End: 2024-02-22

## 2024-02-22 RX ORDER — IPRATROPIUM BROMIDE AND ALBUTEROL SULFATE 2.5; .5 MG/3ML; MG/3ML
3 SOLUTION RESPIRATORY (INHALATION)
Status: DISCONTINUED | OUTPATIENT
Start: 2024-02-22 | End: 2024-02-22

## 2024-02-22 RX ORDER — LEVALBUTEROL INHALATION SOLUTION 1.25 MG/3ML
SOLUTION RESPIRATORY (INHALATION)
Status: DISCONTINUED
Start: 2024-02-22 | End: 2024-02-22 | Stop reason: WASHOUT

## 2024-02-22 RX ORDER — KETOROLAC TROMETHAMINE 30 MG/ML
15 INJECTION, SOLUTION INTRAMUSCULAR; INTRAVENOUS ONCE
Status: COMPLETED | OUTPATIENT
Start: 2024-02-22 | End: 2024-02-22

## 2024-02-22 RX ORDER — POTASSIUM CHLORIDE 20 MEQ/1
20 TABLET, EXTENDED RELEASE ORAL ONCE
Status: COMPLETED | OUTPATIENT
Start: 2024-02-22 | End: 2024-02-22

## 2024-02-22 RX ORDER — LIDOCAINE 50 MG/G
1 PATCH TOPICAL DAILY
Status: DISCONTINUED | OUTPATIENT
Start: 2024-02-22 | End: 2024-02-28

## 2024-02-22 RX ORDER — LEVALBUTEROL INHALATION SOLUTION 1.25 MG/3ML
1.25 SOLUTION RESPIRATORY (INHALATION) ONCE
Status: COMPLETED | OUTPATIENT
Start: 2024-02-22 | End: 2024-02-22

## 2024-02-22 RX ORDER — METHYLPREDNISOLONE SODIUM SUCCINATE 40 MG/ML
40 INJECTION, POWDER, LYOPHILIZED, FOR SOLUTION INTRAMUSCULAR; INTRAVENOUS EVERY 8 HOURS SCHEDULED
Status: DISCONTINUED | OUTPATIENT
Start: 2024-02-22 | End: 2024-02-24

## 2024-02-22 RX ORDER — ALBUTEROL SULFATE 2.5 MG/3ML
5 SOLUTION RESPIRATORY (INHALATION) EVERY 2 HOUR PRN
Status: DISCONTINUED | OUTPATIENT
Start: 2024-02-22 | End: 2024-02-22

## 2024-02-22 RX ORDER — ALBUTEROL SULFATE 2.5 MG/3ML
2.5 SOLUTION RESPIRATORY (INHALATION) EVERY 2 HOUR PRN
Status: DISCONTINUED | OUTPATIENT
Start: 2024-02-22 | End: 2024-02-22

## 2024-02-22 RX ORDER — LEVALBUTEROL 1.25 MG/.5ML
2.5 SOLUTION, CONCENTRATE RESPIRATORY (INHALATION) ONCE
Status: DISCONTINUED | OUTPATIENT
Start: 2024-02-22 | End: 2024-02-22

## 2024-02-22 RX ORDER — AZITHROMYCIN 500 MG/1
500 TABLET, FILM COATED ORAL EVERY 24 HOURS
Status: COMPLETED | OUTPATIENT
Start: 2024-02-22 | End: 2024-02-24

## 2024-02-22 RX ADMIN — POTASSIUM CHLORIDE 20 MEQ: 1500 TABLET, EXTENDED RELEASE ORAL at 02:45

## 2024-02-22 RX ADMIN — LEVALBUTEROL HYDROCHLORIDE 1.25 MG: 1.25 SOLUTION RESPIRATORY (INHALATION) at 13:59

## 2024-02-22 RX ADMIN — HEPARIN SODIUM 5000 UNITS: 5000 INJECTION INTRAVENOUS; SUBCUTANEOUS at 06:11

## 2024-02-22 RX ADMIN — METHYLPREDNISOLONE SODIUM SUCCINATE 40 MG: 40 INJECTION, POWDER, FOR SOLUTION INTRAMUSCULAR; INTRAVENOUS at 06:04

## 2024-02-22 RX ADMIN — ACETAMINOPHEN 650 MG: 325 TABLET, FILM COATED ORAL at 18:56

## 2024-02-22 RX ADMIN — LIDOCAINE 1 PATCH: 50 PATCH CUTANEOUS at 12:07

## 2024-02-22 RX ADMIN — LEVALBUTEROL HYDROCHLORIDE 1.25 MG: 1.25 SOLUTION RESPIRATORY (INHALATION) at 08:30

## 2024-02-22 RX ADMIN — IPRATROPIUM BROMIDE 1 MG: 0.5 SOLUTION RESPIRATORY (INHALATION) at 00:50

## 2024-02-22 RX ADMIN — METHYLPREDNISOLONE SODIUM SUCCINATE 125 MG: 125 INJECTION, POWDER, FOR SOLUTION INTRAMUSCULAR; INTRAVENOUS at 00:51

## 2024-02-22 RX ADMIN — MAGNESIUM SULFATE HEPTAHYDRATE 2 G: 40 INJECTION, SOLUTION INTRAVENOUS at 02:03

## 2024-02-22 RX ADMIN — OXYCODONE HYDROCHLORIDE 5 MG: 5 TABLET ORAL at 22:54

## 2024-02-22 RX ADMIN — ALBUTEROL SULFATE 5 MG: 2.5 SOLUTION RESPIRATORY (INHALATION) at 03:58

## 2024-02-22 RX ADMIN — ACETAMINOPHEN 650 MG: 325 TABLET, FILM COATED ORAL at 12:07

## 2024-02-22 RX ADMIN — AZITHROMYCIN 500 MG: 500 TABLET, FILM COATED ORAL at 10:20

## 2024-02-22 RX ADMIN — METHYLPREDNISOLONE SODIUM SUCCINATE 40 MG: 40 INJECTION, POWDER, FOR SOLUTION INTRAMUSCULAR; INTRAVENOUS at 18:51

## 2024-02-22 RX ADMIN — ALBUTEROL SULFATE 10 MG: 2.5 SOLUTION RESPIRATORY (INHALATION) at 00:50

## 2024-02-22 RX ADMIN — LEVALBUTEROL HYDROCHLORIDE 1.25 MG: 1.25 SOLUTION RESPIRATORY (INHALATION) at 02:21

## 2024-02-22 RX ADMIN — FLUTICASONE FUROATE 1 PUFF: 100 POWDER RESPIRATORY (INHALATION) at 08:42

## 2024-02-22 RX ADMIN — ISODIUM CHLORIDE 12 ML: 0.03 SOLUTION RESPIRATORY (INHALATION) at 00:49

## 2024-02-22 RX ADMIN — ALBUTEROL SULFATE 2.5 MG: 2.5 SOLUTION RESPIRATORY (INHALATION) at 18:33

## 2024-02-22 RX ADMIN — KETOROLAC TROMETHAMINE 15 MG: 30 INJECTION, SOLUTION INTRAMUSCULAR; INTRAVENOUS at 03:57

## 2024-02-22 RX ADMIN — HEPARIN SODIUM 5000 UNITS: 5000 INJECTION INTRAVENOUS; SUBCUTANEOUS at 16:52

## 2024-02-22 RX ADMIN — LEVALBUTEROL HYDROCHLORIDE 1.25 MG: 1.25 SOLUTION RESPIRATORY (INHALATION) at 20:15

## 2024-02-22 NOTE — CASE MANAGEMENT
Case Management Assessment & Discharge Planning Note    Patient name Ganesh Bowden  Location /-01 MRN [id number]  : 1980 Date 2024       Current Admission Date: 2024  Current Admission Diagnosis:Mild persistent asthma with acute exacerbation   Patient Active Problem List    Diagnosis Date Noted    Leukocytosis 2024    Acute hypokalemia 2024    Stage 2 chronic kidney disease 2022    Dyslipidemia 2022    Annual physical exam 2022    Elevated serum creatinine 2022    Vitamin D deficiency 2022    Mild persistent asthma with acute exacerbation 2021      LOS (days): 1  Geometric Mean LOS (GMLOS) (days):   Days to GMLOS:     OBJECTIVE:              Current admission status: Observation  Referral Reason: Other    Preferred Pharmacy:   [facility] [address] [place] [id number]  Phone: [phone] Fax: [phone]    Primary Care Provider: Beti Negron MD    Primary Insurance:   Secondary Insurance:     ASSESSMENT:  Active Health Care Proxies    There are no active Health Care Proxies on file.       Obs Notice Signed: 24    Readmission Root Cause  30 Day Readmission: No    Patient Information  Admitted from:: Home  Mental Status: Alert  During Assessment patient was accompanied by: Not accompanied during assessment  Primary Caregiver: Self  Support Systems: Self  County of Residence: [place]  What city do you live in?: [place]  Home entry access options. Select all that apply.: Stairs  Number of steps to enter home.: One Flight (15)  Do the steps have railings?: Yes  Type of Current Residence: Apartment  Floor Level: 1  Upon entering residence, is there a bedroom on the main floor (no further steps)?: Yes  Upon entering residence, is there a bathroom on the main floor (no further steps)?: Yes  Living Arrangements: Lives Alone  Is patient a ?:  No    Activities of Daily Living Prior to Admission  Functional Status: Independent  Completes ADLs independently?: Yes  Ambulates independently?: Yes  Does patient currently own DME?: Yes  What DME does the patient currently own?: Nebulizer (It is not currently working)  Does patient have a history of Outpatient Therapy (PT/OT)?: No  Does the patient have a history of Short-Term Rehab?: No  Does patient have a history of HHC?: No  Does patient currently have HHC?: No    Patient Information Continued  Income Source: Employed  Does patient have a history of substance abuse?: No  Does patient have a history of Mental Health Diagnosis?: No    Means of Transportation  Means of Transport to Appts:: Drives Self      Social Determinants of Health (SDOH)      Flowsheet Row Most Recent Value   Housing Stability    In the last 12 months, was there a time when you were not able to pay the mortgage or rent on time? Y   In the last 12 months, how many places have you lived? 1   In the last 12 months, was there a time when you did not have a steady place to sleep or slept in a shelter (including now)? N   Transportation Needs    In the past 12 months, has lack of transportation kept you from medical appointments or from getting medications? no   In the past 12 months, has lack of transportation kept you from meetings, work, or from getting things needed for daily living? No   Food Insecurity    Within the past 12 months, you worried that your food would run out before you got the money to buy more. Sometimes   Within the past 12 months, the food you bought just didn't last and you didn't have money to get more. Sometimes   Utilities    In the past 12 months has the electric, gas, oil, or water company threatened to shut off services in your home? Yes            DISCHARGE DETAILS:    Discharge planning discussed with:: Patient  Freedom of Choice: Yes  Comments - Freedom of Choice: CM met with patient to introduce role and do and  do an assesment. Patient stated concerns regarding medication affordability and broken DME. Financial counselors contacted and DME ordered.  CM contacted family/caregiver?: No- see comments (Patient states that he does not have anyone.)  Were Treatment Team discharge recommendations reviewed with patient/caregiver?: Yes  Did patient/caregiver verbalize understanding of patient care needs?: Yes  Were patient/caregiver advised of the risks associated with not following Treatment Team discharge recommendations?: Yes    Contacts  Patient Contacts: Patient states that he has no one    Requested Home Health Care         Is the patient interested in HHC at discharge?: No    DME Referral Provided  Referral made for DME?: Yes  DME referral completed for the following items:: Nebulizer  DME Supplier Name:: [employer]    Other Referral/Resources/Interventions Provided:  Financial Resources Provided: Financial Counselor, Indigent DME  Interventions: DME, Other (Specify)  Referral Comments: Patient provided resources on medical assistance.    Would you like to participate in our [facility] service program?  : No - Declined    Treatment Team Recommendation: Home  Discharge Destination Plan:: Home

## 2024-02-22 NOTE — LETTER
[facility] 3RD  FLOOR MED SURG UNIT  [address] [address]  Dept: [phone]    February 29, 2024     Patient: Ganesh Bowden   YOB: 1980   Date of Visit: 2/22/2024       To Whom it May Concern:    Ganesh Bowden is under my professional care. He was seen in the hospital from 2/22/2024 to 02/29/24. He may return to work on 3/7/24 without limitations.    If you have any questions or concerns, please don't hesitate to call.         Sincerely,          Radha Rudolph PA-C

## 2024-02-22 NOTE — ED PROVIDER NOTES
History  Chief Complaint   Patient presents with    Shortness of Breath     From waiting room. Severe SOB. Inhaler q20m w.o relief, was using nebulizer txmt but ran out last night. Hx asthma     Patient reports that he has been coughing white mucus for the past 2 weeks.  He notes clear cough.  He denies any fevers.  He reports that his breathing has been out of control since then.  He reports that he has used 150 puffs of his albuterol MDI in addition to using nebulizer over the past 2 weeks.  He is now completely out of albuterol.  He ran out last night.  He does have chest tightness and pain in bilateral back particular with coughing.  He notes that he does have a history of asthma, but has not been hospitalized for it since he was a child.          Prior to Admission Medications   Prescriptions Last Dose Informant Patient Reported? Taking?   ASHWAGANDHA PO   Yes No   Sig: Take by mouth in the morning   albuterol (2.5 mg/3 mL) 0.083 % nebulizer solution  Self Yes No   Sig: Take 2.5 mg by nebulization every 6 (six) hours as needed for wheezing   Patient not taking: Reported on 5/5/2022    albuterol (PROVENTIL HFA,VENTOLIN HFA) 90 mcg/act inhaler  Self Yes No   Sig: Inhale 2 puffs every 6 (six) hours as needed for wheezing   ergocalciferol (VITAMIN D2) 50,000 units  Self No No   Sig: Take 1 capsule (50,000 Units total) by mouth once a week for 8 doses   montelukast (Singulair) 10 mg tablet  Self Yes No   Sig: Take 10 mg by mouth daily at bedtime      Facility-Administered Medications: None       Past Medical History:   Diagnosis Date    Asthma     9/30/21  Last attack was greater than one month ago; triggered by seasonal allergies    Diverticulitis     last attack of diverticulitis in January 2021    Pneumothorax        Past Surgical History:   Procedure Laterality Date    CHEST TUBE INSERTION      COLONOSCOPY         Family History   Problem Relation Age of Onset    Arthritis Mother     Asthma Mother     Asthma  Father     Asthma Other     Diabetes Other     Colon cancer Paternal Uncle      I have reviewed and agree with the history as documented.    E-Cigarette/Vaping    E-Cigarette Use Never User      E-Cigarette/Vaping Substances    Nicotine No     THC No     CBD No     Flavoring No     Other No     Unknown No      Social History     Tobacco Use    Smoking status: Never    Smokeless tobacco: Never    Tobacco comments:     rarely--one every three months   Vaping Use    Vaping status: Never Used   Substance Use Topics    Alcohol use: Yes     Comment: occasionally-3 beers/week    Drug use: No       Review of Systems   All other systems reviewed and are negative.      Physical Exam  Physical Exam  Vitals and nursing note reviewed.   Constitutional:       General: He is not in acute distress.     Appearance: He is well-developed. He is diaphoretic.   HENT:      Head: Normocephalic and atraumatic.   Eyes:      Conjunctiva/sclera: Conjunctivae normal.   Cardiovascular:      Rate and Rhythm: Normal rate and regular rhythm.      Heart sounds: No murmur heard.  Pulmonary:      Effort: Respiratory distress (Severe) present.      Breath sounds: Wheezing (Severe expiratory wheezing, severely prolonged expiratory phase) present.      Comments: Accessory muscle use  Abdominal:      Palpations: Abdomen is soft.      Tenderness: There is no abdominal tenderness.   Musculoskeletal:         General: No swelling.      Cervical back: Neck supple.   Skin:     General: Skin is warm.      Capillary Refill: Capillary refill takes less than 2 seconds.      Comments: Diaphoretic   Neurological:      General: No focal deficit present.      Mental Status: He is alert.   Psychiatric:         Mood and Affect: Mood normal.         Vital Signs  ED Triage Vitals   Temperature Pulse Respirations Blood Pressure SpO2   02/22/24 0100 02/22/24 0054 02/22/24 0054 02/22/24 0054 02/22/24 0054   98.2 °F (36.8 °C) (!) 111 (!) 30 (!) 137/106 98 %      Temp Source  Heart Rate Source Patient Position - Orthostatic VS BP Location FiO2 (%)   02/22/24 0100 02/22/24 0054 02/22/24 0054 02/22/24 0054 --   Axillary Monitor Lying Left arm       Pain Score       02/22/24 0345       6           Vitals:    02/22/24 0345 02/22/24 0400 02/22/24 0415 02/22/24 0430   BP:  141/80  135/81   Pulse: 97 88 102 105   Patient Position - Orthostatic VS:    Lying         Visual Acuity      ED Medications  Medications   albuterol inhalation solution 10 mg (10 mg Nebulization Given 2/22/24 0050)   ipratropium (ATROVENT) 0.02 % inhalation solution 1 mg (1 mg Nebulization Given 2/22/24 0050)   sodium chloride 0.9 % inhalation solution 12 mL (12 mL Nebulization Given 2/22/24 0049)   methylPREDNISolone sodium succinate (Solu-MEDROL) injection 125 mg (125 mg Intravenous Given 2/22/24 0051)   potassium chloride (Klor-Con M20) CR tablet 20 mEq (20 mEq Oral Given 2/22/24 0245)   magnesium sulfate 2 g/50 mL IVPB (premix) 2 g (0 g Intravenous Stopped 2/22/24 0210)   levalbuterol (XOPENEX) inhalation solution 1.25 mg (1.25 mg Nebulization Given 2/22/24 0221)   ketorolac (TORADOL) injection 15 mg (15 mg Intravenous Given 2/22/24 0357)   albuterol inhalation solution 5 mg (5 mg Nebulization Given 2/22/24 0358)       Diagnostic Studies  Results Reviewed       Procedure Component Value Units Date/Time    HS Troponin I 2hr [[id number]]  (Normal) Collected: 02/22/24 0246    Lab Status: Final result Specimen: Blood from Arm, Right Updated: 02/22/24 0315     hs TnI 2hr 5 ng/L      Delta 2hr hsTnI -1 ng/L     HS Troponin I 4hr [[id number]]     Lab Status: No result Specimen: Blood     FLU/RSV/COVID - if FLU/RSV clinically relevant [[id number]]  (Normal) Collected: 02/22/24 0053    Lab Status: Final result Specimen: Nares from Nose Updated: 02/22/24 0136     SARS-CoV-2 Negative     INFLUENZA A PCR Negative     INFLUENZA B PCR Negative     RSV PCR Negative    Narrative:      FOR PEDIATRIC PATIENTS - copy/paste COVID Guidelines  URL to browser: [email or web address]    SARS-CoV-2 assay is a Nucleic Acid Amplification assay intended for the  qualitative detection of nucleic acid from SARS-CoV-2 in nasopharyngeal  swabs. Results are for the presumptive identification of SARS-CoV-2 RNA.    Positive results are indicative of infection with SARS-CoV-2, the virus  causing COVID-19, but do not rule out bacterial infection or co-infection  with other viruses. Laboratories within the [place] and its  territories are required to report all positive results to the appropriate  public health authorities. Negative results do not preclude SARS-CoV-2  infection and should not be used as the sole basis for treatment or other  patient management decisions. Negative results must be combined with  clinical observations, patient history, and epidemiological information.  This test has not been FDA cleared or approved.    This test has been authorized by FDA under an Emergency Use Authorization  (EUA). This test is only authorized for the duration of time the  declaration that circumstances exist justifying the authorization of the  emergency use of an in vitro diagnostic tests for detection of SARS-CoV-2  virus and/or diagnosis of COVID-19 infection under section 564(b)(1) of  the Act, 21 U.S.C. 360bbb-3(b)(1), unless the authorization is terminated  or revoked sooner. The test has been validated but independent review by FDA  and CLIA is pending.    Test performed using [employer] GeneXpert: This RT-PCR assay targets N2,  a region unique to SARS-CoV-2. A conserved region in the E-gene was chosen  for pan-Sarbecovirus detection which includes SARS-CoV-2.    According to CMS-2020-01-R, this platform meets the definition of high-throughput technology.    Procalcitonin [[id number]]  (Normal) Collected: 02/22/24 0053    Lab Status: Final result Specimen: Blood from Arm, Right Updated: 02/22/24 0121      Procalcitonin <0.05 ng/ml     HS Troponin 0hr (reflex protocol) [[id number]]  (Normal) Collected: 02/22/24 0053    Lab Status: Final result Specimen: Blood from Arm, Right Updated: 02/22/24 0123     hs TnI 0hr 6 ng/L     Basic metabolic panel [[id number]]  (Abnormal) Collected: 02/22/24 0053    Lab Status: Final result Specimen: Blood from Arm, Right Updated: 02/22/24 0116     Sodium 139 mmol/L      Potassium 3.4 mmol/L      Chloride 103 mmol/L      CO2 25 mmol/L      ANION GAP 11 mmol/L      BUN 14 mg/dL      Creatinine 0.93 mg/dL      Glucose 126 mg/dL      Calcium 9.8 mg/dL      eGFR 100 ml/min/1.73sq m     Narrative:      National Kidney Disease Foundation guidelines for Chronic Kidney Disease (CKD):     Stage 1 with normal or high GFR (GFR > 90 mL/min/1.73 square meters)    Stage 2 Mild CKD (GFR = 60-89 mL/min/1.73 square meters)    Stage 3A Moderate CKD (GFR = 45-59 mL/min/1.73 square meters)    Stage 3B Moderate CKD (GFR = 30-44 mL/min/1.73 square meters)    Stage 4 Severe CKD (GFR = 15-29 mL/min/1.73 square meters)    Stage 5 End Stage CKD (GFR <15 mL/min/1.73 square meters)  Note: GFR calculation is accurate only with a steady state creatinine    CBC and differential [[id number]]  (Abnormal) Collected: 02/22/24 0053    Lab Status: Final result Specimen: Blood from Arm, Right Updated: 02/22/24 0116     WBC 10.83 Thousand/uL      RBC 4.98 Million/uL      Hemoglobin 15.5 g/dL      Hematocrit 45.3 %      MCV 91 fL      MCH 31.1 pg      MCHC 34.2 g/dL      RDW 12.7 %      MPV 10.1 fL      Platelets 251 Thousands/uL      nRBC 0 /100 WBCs      Neutrophils Relative 68 %      Immat GRANS % 0 %      Lymphocytes Relative 23 %      Monocytes Relative 6 %      Eosinophils Relative 2 %      Basophils Relative 1 %      Neutrophils Absolute 7.36 Thousands/µL      Immature Grans Absolute 0.02 Thousand/uL      Lymphocytes Absolute 2.53 Thousands/µL      Monocytes Absolute 0.67 Thousand/µL      Eosinophils Absolute 0.20  Thousand/µL      Basophils Absolute 0.05 Thousands/µL     Lactic acid, plasma (w/reflex if result > 2.0) [[id number]]  (Normal) Collected: 02/22/24 0053    Lab Status: Final result Specimen: Blood from Arm, Right Updated: 02/22/24 0115     LACTIC ACID 1.1 mmol/L     Narrative:      Result may be elevated if tourniquet was used during collection.                   XR chest 1 view portable   ED Interpretation by Guilherme Castellon MD (02/22 0122)   No acute infiltrate                 Procedures  CriticalCare Time    Date/Time: 2/22/2024 12:56 AM    Performed by: Guilherme Castellon MD  Authorized by: Guilherme Castellon MD    Critical care provider statement:     Critical care time (minutes):  45    Critical care was necessary to treat or prevent imminent or life-threatening deterioration of the following conditions:  Respiratory failure (severe wheezing with accessory muscle use requiring WASSERMAN nebs)    Critical care was time spent personally by me on the following activities:  Obtaining history from patient or surrogate, development of treatment plan with patient or surrogate, evaluation of patient's response to treatment, examination of patient, interpretation of cardiac output measurements, ordering and performing treatments and interventions, ordering and review of laboratory studies, ordering and review of radiographic studies, re-evaluation of patient's condition and review of old charts           ED Course  ED Course as of 02/22/24 0451   Thu Feb 22, 2024   0055 Potassium(!): 3.4  Consistent with ongoing nebulizer.  Will repeat due to anticipation of continued nebs   0059 EKG: ST at 103, normal ST-t, QTc 455   0127 Procalcitonin: <0.05  Not suggestive of bacterial infection.   0141 Patient still has severe inspiratory and expiratory wheezing but no longer is using accessory muscles.  He reports feeling significantly better.   0157 EKG: ST at 112 with normal QRS, normal ST-t, QTc 469   0200 Patient continues to  wheeze, although work-of-breathing improved from initial presentation.  Xoponex ordered and magnesium bolus.   0220 hs TnI 0hr: 6  Not concerning for MI given 2 week history of symptoms   0316 Delta 2hr hsTnI: -1  MI ruled out.   0347 Oxygen saturation drop to 89% while patient was sleeping but patient's work of breathing remains at improved state after nebs.  Will continue to observe.   0355 Patient reports increased chest tightness.  Wheezing slightly worsened from 1 hour ago.  Repeat neb ordered.   0424 Sounds improved but with continued inspiratory and expiratory wheezing.  Pulse ox 94% on RA.   0433 Case discussed with MINE Packer who evaluated the patient at bedside and agreed with plan for admission to Dr. Jaramillo's service.                               SBIRT 20yo+      Flowsheet Row Most Recent Value   Initial Alcohol Screen: US AUDIT-C     1. How often do you have a drink containing alcohol? 0 Filed at: 02/22/2024 0111   2. How many drinks containing alcohol do you have on a typical day you are drinking?  0 Filed at: 02/22/2024 0111   3a. Male UNDER 65: How often do you have five or more drinks on one occasion? 0 Filed at: 02/22/2024 0111   Audit-C Score 0 Filed at: 02/22/2024 0111   DEMI: How many times in the past year have you...    Used an illegal drug or used a prescription medication for non-medical reasons? Never Filed at: 02/22/2024 0111                      Medical Decision Making  I evaluate the patient immediately upon arrival due to severe respiratory distress.  I immediately ordered heart neb.  Patient denies any fevers.  Awaiting vitals but patient appears to have severe asthma attack.  Without fevers, doubt sepsis.  Lungs sounds present bilaterally but will r/o pneumothorax with CXR and r/o pneumonia with CXR.    Amount and/or Complexity of Data Reviewed  External Data Reviewed: notes.     Details: Noted to have mildly elevated Cr with normal GFR in 4/2022  Labs: ordered. Decision-making  details documented in ED Course.  Radiology: ordered and independent interpretation performed.    Risk  Prescription drug management.             Disposition  Final diagnoses:   Severe persistent asthma with status asthmaticus     Time reflects when diagnosis was documented in both MDM as applicable and the Disposition within this note       Time User Action Codes Description Comment    2/22/2024  2:49 AM Guilherme Castellon [J45.52] Severe persistent asthma with status asthmaticus           ED Disposition       ED Disposition   Admit    Condition   Stable    Date/Time   Thu Feb 22, 2024 9575    Comment   Case was discussed with MINE Packer and the patient's admission status was agreed to be Admission Status: inpatient status to the service of Dr. Jaramillo .               Follow-up Information    None         Patient's Medications   Discharge Prescriptions    No medications on file       No discharge procedures on file.    PDMP Review         Value Time User    PDMP Reviewed  Yes 1/4/2021 11:29 PM Shilo Sanchez MD            ED Provider  Electronically Signed by             Guilherme Castellon MD  02/22/24 0450

## 2024-02-22 NOTE — PLAN OF CARE
Problem: PAIN - ADULT  Goal: Verbalizes/displays adequate comfort level or baseline comfort level  Description: Interventions:  - Encourage patient to monitor pain and request assistance  - Assess pain using appropriate pain scale  - Administer analgesics based on type and severity of pain and evaluate response  - Implement non-pharmacological measures as appropriate and evaluate response  - Consider cultural and social influences on pain and pain management  - Notify physician/advanced practitioner if interventions unsuccessful or patient reports new pain  Outcome: Progressing     Problem: INFECTION - ADULT  Goal: Absence or prevention of progression during hospitalization  Description: INTERVENTIONS:  - Assess and monitor for signs and symptoms of infection  - Monitor lab/diagnostic results  - Monitor all insertion sites, i.e. indwelling lines, tubes, and drains  - Monitor endotracheal if appropriate and nasal secretions for changes in amount and color  - Trenary appropriate cooling/warming therapies per order  - Administer medications as ordered  - Instruct and encourage patient and family to use good hand hygiene technique  - Identify and instruct in appropriate isolation precautions for identified infection/condition  Outcome: Progressing     Problem: DISCHARGE PLANNING  Goal: Discharge to home or other facility with appropriate resources  Description: INTERVENTIONS:  - Identify barriers to discharge w/patient and caregiver  - Arrange for needed discharge resources and transportation as appropriate  - Identify discharge learning needs (meds, wound care, etc.)  - Arrange for interpretive services to assist at discharge as needed  - Refer to Case Management Department for coordinating discharge planning if the patient needs post-hospital services based on physician/advanced practitioner order or complex needs related to functional status, cognitive ability, or social support system  Outcome: Progressing      Problem: RESPIRATORY - ADULT  Goal: Achieves optimal ventilation and oxygenation  Description: INTERVENTIONS:  - Assess for changes in respiratory status  - Assess for changes in mentation and behavior  - Position to facilitate oxygenation and minimize respiratory effort  - Oxygen administered by appropriate delivery if ordered  - Initiate smoking cessation education as indicated  - Encourage broncho-pulmonary hygiene including cough, deep breathe, Incentive Spirometry  - Assess the need for suctioning and aspirate as needed  - Assess and instruct to report SOB or any respiratory difficulty  - Respiratory Therapy support as indicated  Outcome: Progressing

## 2024-02-22 NOTE — ASSESSMENT & PLAN NOTE
Patient with potassium of 3.4, received potassium chloride 20 mEq in the ED  Repeat BMP and replete as needed

## 2024-02-22 NOTE — ASSESSMENT & PLAN NOTE
Patient with history of asthma presented to the ED severely short of breath, diaphoretic, wheezing, tachycardic.  Last hospitalization for asthma was in childhood. RR 30 oxygen saturation 98% on aerosol mask.  Reports cough productive of clear sputum and dyspnea x 2 weeks, reportedly using his rescue inhaler and albuterol nebulizer treatments once every hour for the past 2 weeks without relief.  ED treatment:  WASSERMAN neb x 1 hour  Xopenex x 2  Solu-Medrol 125 mg  Magnesium sulfate IVB x 2  Patient with improvement of symptoms, able to speak in full sentences.  Vital signs at time of admission 94% on room air, RR 22 heart rate 105  Flu/RSV/COVID panel negative  CXR negative for pneumothorax or pneumonia  Plan  Maintain oxygen saturation above 90%  Consult placed to pulmonology, recommendations appreciated  Continue methylprednisone IV  Continue ipratropium  Continue albuterol

## 2024-02-22 NOTE — QUICK NOTE
Post admit follow-up note: Patient seen and examined at bedside independently of admitting provider.  Patient feeling much better after breathing treatments given in the ED.  Continues to have some wheezing bilaterally, but he states it is much improved.  Appreciate pulm consultation, continue steroids and scheduled/as needed breathing treatments at this time

## 2024-02-22 NOTE — RESPIRATORY THERAPY NOTE
"RT Protocol Note  Ganesh Bowden 43 y.o. male MRN: [id number]  Unit/Bed#: -01 Encounter: [id number]    Assessment    Principal Problem:    Mild persistent asthma with acute exacerbation  Active Problems:    Stage 2 chronic kidney disease    Dyslipidemia    Leukocytosis    Acute hypokalemia      Home Pulmonary Medications:  Albuterol HFA       Past Medical History:   Diagnosis Date    Asthma     9/30/21  Last attack was greater than one month ago; triggered by seasonal allergies    Diverticulitis     last attack of diverticulitis in January 2021    Pneumothorax      Social History     Socioeconomic History    Marital status: Single     Spouse name: None    Number of children: None    Years of education: None    Highest education level: None   Occupational History    None   Tobacco Use    Smoking status: Never    Smokeless tobacco: Never    Tobacco comments:     rarely--one every three months   Vaping Use    Vaping status: Never Used   Substance and Sexual Activity    Alcohol use: Yes     Comment: occasionally-3 beers/week    Drug use: No    Sexual activity: None   Other Topics Concern    None   Social History Narrative    None     Social Determinants of Health     Financial Resource Strain: Not on file   Food Insecurity: Not on file   Transportation Needs: Not on file   Physical Activity: Not on file   Stress: Not on file   Social Connections: Not on file   Intimate Partner Violence: Not on file   Housing Stability: Not on file       Subjective         Objective    Physical Exam:   Assessment Type: During-treatment  General Appearance: Alert, Awake  Respiratory Pattern: Normal  Chest Assessment: Chest expansion symmetrical  Bilateral Breath Sounds: Expiratory wheezes    Vitals:  Blood pressure 144/88, pulse 100, temperature 98.4 °F (36.9 °C), temperature source Tympanic, resp. rate 20, height 5' 10\" (1.778 m), weight 72.6 kg (160 lb 2 oz), SpO2 93%.          Imaging and other studies: I have personally reviewed " pertinent reports.            Plan    Respiratory Plan: Home Bronchodilator Patient pathway        Resp Comments: Assessed pt per protocol. Pt has a Asthma history. Pt has a rescue inhaler that he uses often. Pt is currently on RA. Will order TID Xopenex treatments. Respiratory to follow.

## 2024-02-22 NOTE — H&P
[facility]  H&P  Name: Ganesh Bowden 43 y.o. male I MRN: [id number]  Unit/Bed#: ED 09 I Date of Admission: 2/22/2024   Date of Service: 2/22/2024 I Hospital Day: 0      Assessment/Plan   * Mild persistent asthma with acute exacerbation  Assessment & Plan  Patient with history of asthma presented to the ED severely short of breath, diaphoretic, wheezing, tachycardic.  Last hospitalization for asthma was in childhood. RR 30 oxygen saturation 98% on aerosol mask.  Reports cough productive of clear sputum and dyspnea x 2 weeks, reportedly using his rescue inhaler and albuterol nebulizer treatments once every hour for the past 2 weeks without relief.  ED treatment:  WASSERMAN neb x 1 hour  Xopenex x 2  Solu-Medrol 125 mg  Magnesium sulfate IVB x 2  Patient with improvement of symptoms, able to speak in full sentences.  Vital signs at time of admission 94% on room air, RR 22 heart rate 105  Flu/RSV/COVID panel negative  CXR negative for pneumothorax or pneumonia  Plan  Maintain oxygen saturation above 90%  Consult placed to pulmonology, recommendations appreciated  Continue methylprednisone IV  Continue ipratropium  Continue albuterol    Leukocytosis  Assessment & Plan  Patient with mildly elevated white cells of 10.83.  Low suspicion for infection, likely due to recent excessive albuterol inhaler use.  Plan  Continue to monitor off antibiotics with CBC    Acute hypokalemia  Assessment & Plan  Patient with potassium of 3.4, received potassium chloride 20 mEq in the ED  Repeat BMP and replete as needed    Stage 2 chronic kidney disease  Assessment & Plan  Lab Results   Component Value Date    EGFR 100 02/22/2024    EGFR 61 03/21/2022    EGFR 59 01/04/2021    CREATININE 0.93 02/22/2024    CREATININE 1.40 (H) 03/21/2022    CREATININE 1.46 (H) 01/04/2021   Patient following with nephrology as an outpatient, last seen in September 2022  Continue to monitor BMP    Dyslipidemia  Assessment & Plan  Prior  lipid panel in 2022, ordered by his PCP revealed elevated cholesterol, triglycerides, LDLs.  Currently does not take any medication for this, recommend follow-up outpatient PCP for further medical management       VTE Pharmacologic Prophylaxis: VTE Score: 3 Moderate Risk (Score 3-4) - Pharmacological DVT Prophylaxis Ordered: heparin.  Code Status: Level 3 - DNAR and DNI per patient  Discussion with family: Patient declined call to .     Anticipated Length of Stay: Patient will be admitted on an inpatient basis with an anticipated length of stay of greater than 2 midnights secondary to severe asthma exacerbation.    Total Time Spent on Date of Encounter in care of patient: 75 mins. This time was spent on one or more of the following: performing physical exam; counseling and coordination of care; obtaining or reviewing history; documenting in the medical record; reviewing/ordering tests, medications or procedures; communicating with other healthcare professionals and discussing with patient's family/caregivers.    Chief Complaint: Shortness of breath    History of Present Illness:  Ganesh Bowden is a 43 y.o. male with a PMH of hyperlipidemia, hypertension, CKD stage II, mild persistent asthma who presents with 2 weeks of shortness of breath with associated cough that is productive of clear sputum.  He states he was using his rescue inhaler nebulizing treatments at least once every hour without relief, he normally has not had to use his inhaler.  He attributes this to recent stress.  States that he had to clean his apartment for any possible triggers.  Works in construction.  Reports associated chest tightness and diaphoresis.  Denies fever, chills, palpitations, N/V/D, lightheadedness, syncope.  Denies current tobacco use, alcohol use, recreational drug use.    Review of Systems:  Review of Systems   Constitutional:  Positive for diaphoresis. Negative for chills and fever.   HENT:  Negative for ear  pain and sore throat.    Eyes:  Negative for pain and visual disturbance.   Respiratory:  Positive for cough, chest tightness, shortness of breath and wheezing.    Cardiovascular:  Negative for chest pain and palpitations.   Gastrointestinal:  Negative for abdominal pain, diarrhea, nausea and vomiting.   Genitourinary:  Negative for dysuria and hematuria.   Musculoskeletal:  Negative for arthralgias and back pain.   Skin:  Negative for color change and rash.   Neurological:  Negative for dizziness, seizures, syncope and headaches.   All other systems reviewed and are negative.      Past Medical and Surgical History:   Past Medical History:   Diagnosis Date    Asthma     9/30/21  Last attack was greater than one month ago; triggered by seasonal allergies    Diverticulitis     last attack of diverticulitis in January 2021    Pneumothorax        Past Surgical History:   Procedure Laterality Date    CHEST TUBE INSERTION      COLONOSCOPY         Meds/Allergies:  Prior to Admission medications    Medication Sig Start Date End Date Taking? Authorizing Provider   albuterol (2.5 mg/3 mL) 0.083 % nebulizer solution Take 2.5 mg by nebulization every 6 (six) hours as needed for wheezing  Patient not taking: Reported on 5/5/2022     Historical Provider, MD   albuterol (PROVENTIL HFA,VENTOLIN HFA) 90 mcg/act inhaler Inhale 2 puffs every 6 (six) hours as needed for wheezing    Historical Provider, MD   ASHWAGANDHA PO Take by mouth in the morning    Historical Provider, MD   ergocalciferol (VITAMIN D2) 50,000 units Take 1 capsule (50,000 Units total) by mouth once a week for 8 doses 3/23/22 5/12/22  Zara Johnson MD   montelukast (Singulair) 10 mg tablet Take 10 mg by mouth daily at bedtime    Historical Provider, MD FARMER have reviewed home medications with patient personally.    Allergies: No Known Allergies    Social History:  Marital Status: Single   Occupation: Construction  Patient Pre-hospital Living Situation:  Home  Patient Pre-hospital Level of Mobility: walks  Patient Pre-hospital Diet Restrictions: None  Substance Use History:   Social History     Substance and Sexual Activity   Alcohol Use Yes    Comment: occasionally-3 beers/week     Social History     Tobacco Use   Smoking Status Never   Smokeless Tobacco Never   Tobacco Comments    rarely--one every three months     Social History     Substance and Sexual Activity   Drug Use No       Family History:  Family History   Problem Relation Age of Onset    Arthritis Mother     Asthma Mother     Asthma Father     Asthma Other     Diabetes Other     Colon cancer Paternal Uncle        Physical Exam:     Vitals:   Blood Pressure: 135/81 (02/22/24 0430)  Pulse: 105 (02/22/24 0430)  Temperature: 98.2 °F (36.8 °C) (02/22/24 0100)  Temp Source: Axillary (02/22/24 0100)  Respirations: 22 (02/22/24 0430)  SpO2: 94 % (02/22/24 0430)    Physical Exam  Constitutional:       General: He is not in acute distress.  HENT:      Head: Normocephalic and atraumatic.      Nose: No congestion.      Mouth/Throat:      Mouth: Mucous membranes are moist.   Eyes:      Extraocular Movements: Extraocular movements intact.   Cardiovascular:      Rate and Rhythm: Regular rhythm. Tachycardia present.      Heart sounds: Normal heart sounds.   Pulmonary:      Effort: Tachypnea present.      Breath sounds: No stridor. Examination of the right-upper field reveals wheezing. Examination of the left-upper field reveals wheezing. Examination of the right-middle field reveals wheezing. Examination of the right-lower field reveals wheezing. Examination of the left-lower field reveals wheezing. Wheezing present.   Abdominal:      General: Bowel sounds are normal.      Palpations: Abdomen is soft.      Tenderness: There is no abdominal tenderness.   Musculoskeletal:      Right lower leg: No edema.      Left lower leg: No edema.   Skin:     General: Skin is warm and dry.   Neurological:      General: No focal  deficit present.      Mental Status: He is alert and oriented to person, place, and time.        Additional Data:     Lab Results:  Results from last 7 days   Lab Units 02/22/24  0053   WBC Thousand/uL 10.83*   HEMOGLOBIN g/dL 15.5   HEMATOCRIT % 45.3   PLATELETS Thousands/uL 251   NEUTROS PCT % 68   LYMPHS PCT % 23   MONOS PCT % 6   EOS PCT % 2     Results from last 7 days   Lab Units 02/22/24  0053   SODIUM mmol/L 139   POTASSIUM mmol/L 3.4*   CHLORIDE mmol/L 103   CO2 mmol/L 25   BUN mg/dL 14   CREATININE mg/dL 0.93   ANION GAP mmol/L 11   CALCIUM mg/dL 9.8   GLUCOSE RANDOM mg/dL 126                 Results from last 7 days   Lab Units 02/22/24  0053   LACTIC ACID mmol/L 1.1   PROCALCITONIN ng/ml <0.05       Lines/Drains:  Invasive Devices       Peripheral Intravenous Line  Duration             Peripheral IV 02/22/24 Distal;Right;Upper;Ventral (anterior) Arm <1 day                        Imaging: Reviewed radiology reports from this admission including: chest xray  XR chest 1 view portable   ED Interpretation by Guilherme Castellon MD (02/22 0122)   No acute infiltrate          EKG and Other Studies Reviewed on Admission:   EKG: Sinus Tachycardia. .    ** Please Note: This note has been constructed using a voice recognition system. **

## 2024-02-22 NOTE — ASSESSMENT & PLAN NOTE
Patient with mildly elevated white cells of 10.83.  Low suspicion for infection, likely due to recent excessive albuterol inhaler use.  Plan  Continue to monitor off antibiotics with CBC

## 2024-02-22 NOTE — ASSESSMENT & PLAN NOTE
Prior lipid panel in 2022, ordered by his PCP revealed elevated cholesterol, triglycerides, LDLs.  Currently does not take any medication for this, recommend follow-up outpatient PCP for further medical management

## 2024-02-22 NOTE — ASSESSMENT & PLAN NOTE
Lab Results   Component Value Date    EGFR 100 02/22/2024    EGFR 61 03/21/2022    EGFR 59 01/04/2021    CREATININE 0.93 02/22/2024    CREATININE 1.40 (H) 03/21/2022    CREATININE 1.46 (H) 01/04/2021   Patient following with nephrology as an outpatient, last seen in September 2022  Continue to monitor BMP

## 2024-02-23 LAB
ANION GAP SERPL CALCULATED.3IONS-SCNC: 10 MMOL/L
ATRIAL RATE: 112 BPM
ATRIAL RATE: 116 BPM
ATRIAL RATE: 119 BPM
BUN SERPL-MCNC: 16 MG/DL (ref 5–25)
CALCIUM SERPL-MCNC: 9.9 MG/DL (ref 8.4–10.2)
CHLORIDE SERPL-SCNC: 103 MMOL/L (ref 96–108)
CO2 SERPL-SCNC: 24 MMOL/L (ref 21–32)
CREAT SERPL-MCNC: 0.88 MG/DL (ref 0.6–1.3)
DME PARACHUTE DELIVERY DATE REQUESTED: NORMAL
DME PARACHUTE ITEM DESCRIPTION: NORMAL
DME PARACHUTE ORDER STATUS: NORMAL
DME PARACHUTE SUPPLIER NAME: NORMAL
DME PARACHUTE SUPPLIER PHONE: NORMAL
ERYTHROCYTE [DISTWIDTH] IN BLOOD BY AUTOMATED COUNT: 13.1 % (ref 11.6–15.1)
GFR SERPL CREATININE-BSD FRML MDRD: 105 ML/MIN/1.73SQ M
GLUCOSE SERPL-MCNC: 123 MG/DL (ref 65–140)
HCT VFR BLD AUTO: 41.5 % (ref 36.5–49.3)
HGB BLD-MCNC: 14 G/DL (ref 12–17)
MCH RBC QN AUTO: 30.9 PG (ref 26.8–34.3)
MCHC RBC AUTO-ENTMCNC: 33.7 G/DL (ref 31.4–37.4)
MCV RBC AUTO: 92 FL (ref 82–98)
P AXIS: 80 DEGREES
P AXIS: 84 DEGREES
P AXIS: 92 DEGREES
PLATELET # BLD AUTO: 225 THOUSANDS/UL (ref 149–390)
PMV BLD AUTO: 10.5 FL (ref 8.9–12.7)
POTASSIUM SERPL-SCNC: 4.2 MMOL/L (ref 3.5–5.3)
PR INTERVAL: 132 MS
PR INTERVAL: 140 MS
PR INTERVAL: 152 MS
QRS AXIS: 78 DEGREES
QRS AXIS: 79 DEGREES
QRS AXIS: 81 DEGREES
QRSD INTERVAL: 84 MS
QRSD INTERVAL: 88 MS
QRSD INTERVAL: 92 MS
QT INTERVAL: 320 MS
QT INTERVAL: 332 MS
QT INTERVAL: 344 MS
QTC INTERVAL: 450 MS
QTC INTERVAL: 461 MS
QTC INTERVAL: 469 MS
RBC # BLD AUTO: 4.53 MILLION/UL (ref 3.88–5.62)
SODIUM SERPL-SCNC: 137 MMOL/L (ref 135–147)
T WAVE AXIS: 72 DEGREES
T WAVE AXIS: 72 DEGREES
T WAVE AXIS: 74 DEGREES
VENTRICULAR RATE: 112 BPM
VENTRICULAR RATE: 116 BPM
VENTRICULAR RATE: 119 BPM
WBC # BLD AUTO: 12.06 THOUSAND/UL (ref 4.31–10.16)

## 2024-02-23 PROCEDURE — 85027 COMPLETE CBC AUTOMATED: CPT | Performed by: STUDENT IN AN ORGANIZED HEALTH CARE EDUCATION/TRAINING PROGRAM

## 2024-02-23 PROCEDURE — 94640 AIRWAY INHALATION TREATMENT: CPT

## 2024-02-23 PROCEDURE — 99232 SBSQ HOSP IP/OBS MODERATE 35: CPT | Performed by: INTERNAL MEDICINE

## 2024-02-23 PROCEDURE — 94760 N-INVAS EAR/PLS OXIMETRY 1: CPT

## 2024-02-23 PROCEDURE — 93010 ELECTROCARDIOGRAM REPORT: CPT | Performed by: INTERNAL MEDICINE

## 2024-02-23 PROCEDURE — 94150 VITAL CAPACITY TEST: CPT

## 2024-02-23 PROCEDURE — 99233 SBSQ HOSP IP/OBS HIGH 50: CPT | Performed by: STUDENT IN AN ORGANIZED HEALTH CARE EDUCATION/TRAINING PROGRAM

## 2024-02-23 PROCEDURE — 80048 BASIC METABOLIC PNL TOTAL CA: CPT | Performed by: STUDENT IN AN ORGANIZED HEALTH CARE EDUCATION/TRAINING PROGRAM

## 2024-02-23 RX ORDER — FLUTICASONE FUROATE AND VILANTEROL 100; 25 UG/1; UG/1
1 POWDER RESPIRATORY (INHALATION)
Status: DISCONTINUED | OUTPATIENT
Start: 2024-02-24 | End: 2024-02-29 | Stop reason: HOSPADM

## 2024-02-23 RX ORDER — BENZONATATE 100 MG/1
100 CAPSULE ORAL 3 TIMES DAILY PRN
Status: DISCONTINUED | OUTPATIENT
Start: 2024-02-23 | End: 2024-02-29 | Stop reason: HOSPADM

## 2024-02-23 RX ADMIN — HEPARIN SODIUM 5000 UNITS: 5000 INJECTION INTRAVENOUS; SUBCUTANEOUS at 18:25

## 2024-02-23 RX ADMIN — BENZONATATE 100 MG: 100 CAPSULE ORAL at 22:48

## 2024-02-23 RX ADMIN — LEVALBUTEROL HYDROCHLORIDE 1.25 MG: 1.25 SOLUTION RESPIRATORY (INHALATION) at 19:50

## 2024-02-23 RX ADMIN — METHYLPREDNISOLONE SODIUM SUCCINATE 40 MG: 40 INJECTION, POWDER, FOR SOLUTION INTRAMUSCULAR; INTRAVENOUS at 06:37

## 2024-02-23 RX ADMIN — LEVALBUTEROL HYDROCHLORIDE 1.25 MG: 1.25 SOLUTION RESPIRATORY (INHALATION) at 08:17

## 2024-02-23 RX ADMIN — HEPARIN SODIUM 5000 UNITS: 5000 INJECTION INTRAVENOUS; SUBCUTANEOUS at 00:32

## 2024-02-23 RX ADMIN — ALBUTEROL SULFATE 2.5 MG: 2.5 SOLUTION RESPIRATORY (INHALATION) at 06:07

## 2024-02-23 RX ADMIN — BENZONATATE 100 MG: 100 CAPSULE ORAL at 06:40

## 2024-02-23 RX ADMIN — HEPARIN SODIUM 5000 UNITS: 5000 INJECTION INTRAVENOUS; SUBCUTANEOUS at 08:33

## 2024-02-23 RX ADMIN — OXYCODONE HYDROCHLORIDE 5 MG: 5 TABLET ORAL at 22:48

## 2024-02-23 RX ADMIN — LIDOCAINE 1 PATCH: 50 PATCH CUTANEOUS at 08:33

## 2024-02-23 RX ADMIN — FLUTICASONE FUROATE 1 PUFF: 100 POWDER RESPIRATORY (INHALATION) at 08:30

## 2024-02-23 RX ADMIN — METHYLPREDNISOLONE SODIUM SUCCINATE 40 MG: 40 INJECTION, POWDER, FOR SOLUTION INTRAMUSCULAR; INTRAVENOUS at 15:59

## 2024-02-23 RX ADMIN — LEVALBUTEROL HYDROCHLORIDE 1.25 MG: 1.25 SOLUTION RESPIRATORY (INHALATION) at 13:59

## 2024-02-23 RX ADMIN — AZITHROMYCIN 500 MG: 500 TABLET, FILM COATED ORAL at 08:32

## 2024-02-23 RX ADMIN — METHYLPREDNISOLONE SODIUM SUCCINATE 40 MG: 40 INJECTION, POWDER, FOR SOLUTION INTRAMUSCULAR; INTRAVENOUS at 22:48

## 2024-02-23 NOTE — PLAN OF CARE
Problem: PAIN - ADULT  Goal: Verbalizes/displays adequate comfort level or baseline comfort level  Description: Interventions:  - Encourage patient to monitor pain and request assistance  - Assess pain using appropriate pain scale  - Administer analgesics based on type and severity of pain and evaluate response  - Implement non-pharmacological measures as appropriate and evaluate response  - Consider cultural and social influences on pain and pain management  - Notify physician/advanced practitioner if interventions unsuccessful or patient reports new pain  Outcome: Progressing     Problem: INFECTION - ADULT  Goal: Absence or prevention of progression during hospitalization  Description: INTERVENTIONS:  - Assess and monitor for signs and symptoms of infection  - Monitor lab/diagnostic results  - Monitor all insertion sites, i.e. indwelling lines, tubes, and drains  - Monitor endotracheal if appropriate and nasal secretions for changes in amount and color  - [place] appropriate cooling/warming therapies per order  - Administer medications as ordered  - Instruct and encourage patient and family to use good hand hygiene technique  - Identify and instruct in appropriate isolation precautions for identified infection/condition  Outcome: Progressing     Problem: DISCHARGE PLANNING  Goal: Discharge to home or other facility with appropriate resources  Description: INTERVENTIONS:  - Identify barriers to discharge w/patient and caregiver  - Arrange for needed discharge resources and transportation as appropriate  - Identify discharge learning needs (meds, wound care, etc.)  - Arrange for interpretive services to assist at discharge as needed  - Refer to Case Management Department for coordinating discharge planning if the patient needs post-hospital services based on physician/advanced practitioner order or complex needs related to functional status, cognitive ability, or social support system  Outcome: Progressing      Problem: RESPIRATORY - ADULT  Goal: Achieves optimal ventilation and oxygenation  Description: INTERVENTIONS:  - Assess for changes in respiratory status  - Assess for changes in mentation and behavior  - Position to facilitate oxygenation and minimize respiratory effort  - Oxygen administered by appropriate delivery if ordered  - Initiate smoking cessation education as indicated  - Encourage broncho-pulmonary hygiene including cough, deep breathe, Incentive Spirometry  - Assess the need for suctioning and aspirate as needed  - Assess and instruct to report SOB or any respiratory difficulty  - Respiratory Therapy support as indicated  Outcome: Progressing

## 2024-02-23 NOTE — PROGRESS NOTES
[facility]  Progress Note  Name: Ganesh Bowden I  MRN: [id number]  Unit/Bed#: -01 I Date of Admission: 2/22/2024   Date of Service: 2/23/2024 I Hospital Day: 1    Assessment/Plan   Acute hypokalemia  Assessment & Plan  Patient with potassium of 3.4, received potassium chloride 20 mEq in the ED  Repeat BMP and replete as needed    Leukocytosis  Assessment & Plan  Patient with mildly elevated white cells of 10.83.  Low suspicion for infection, likely due to recent excessive albuterol inhaler use.  Plan  WBC 12 today, likely related to steroid use, check sputum culture    Dyslipidemia  Assessment & Plan  Prior lipid panel in 2022, ordered by his PCP revealed elevated cholesterol, triglycerides, LDLs.  Currently does not take any medication for this, recommend follow-up outpatient PCP for further medical management    Stage 2 chronic kidney disease  Assessment & Plan  Lab Results   Component Value Date    EGFR 105 02/23/2024    EGFR 106 02/22/2024    EGFR 100 02/22/2024    CREATININE 0.88 02/23/2024    CREATININE 0.86 02/22/2024    CREATININE 0.93 02/22/2024   Patient following with nephrology as an outpatient, last seen in September 2022  Continue to monitor BMP    * Mild persistent asthma with acute exacerbation  Assessment & Plan  Patient with history of asthma presented to the ED severely short of breath, diaphoretic, wheezing, tachycardic.  Last hospitalization for asthma was in childhood. RR 30 oxygen saturation 98% on aerosol mask.  Reports cough productive of clear sputum and dyspnea x 2 weeks, reportedly using his rescue inhaler and albuterol nebulizer treatments once every hour for the past 2 weeks without relief.  ED treatment:  WASSERMAN neb x 1 hour  Xopenex x 2  Solu-Medrol 125 mg  Magnesium sulfate IVB x 2  Patient with improvement of symptoms, able to speak in full sentences.  Vital signs at time of admission 94% on room air, RR 22 heart rate 105  Flu/RSV/COVID panel  negative  CXR negative for pneumothorax or pneumonia  Clinically improving, but patient still having some episodes of marked dyspnea.  Plan to continue current course and aim for discharge over weekend  Resume Breo           VTE Pharmacologic Prophylaxis: VTE Score: 3 Moderate Risk (Score 3-4) - Pharmacological DVT Prophylaxis Ordered: heparin.    Mobility:   Basic Mobility Inpatient Raw Score: 24  JH-HLM Goal: 8: Walk 250 feet or more  JH-HLM Achieved: 7: Walk 25 feet or more  HLM Goal NOT achieved. Continue with multidisciplinary rounding and encourage appropriate mobility to improve upon HLM goals.    Patient Centered Rounds: I performed bedside rounds with nursing staff today.   Discussions with Specialists or Other Care Team Provider: Pulmonology    Education and Discussions with Family / Patient: Patient declined call to .     Total Time Spent on Date of Encounter in care of patient: 45 mins. This time was spent on one or more of the following: performing physical exam; counseling and coordination of care; obtaining or reviewing history; documenting in the medical record; reviewing/ordering tests, medications or procedures; communicating with other healthcare professionals and discussing with patient's family/caregivers.    Current Length of Stay: 1 day(s)  Current Patient Status: Observation   Certification Statement: The patient will continue to require additional inpatient hospital stay due to Asthma exacerbation  Discharge Plan: Anticipate discharge in 24-48 hrs to home.    Code Status: Level 1 - Full Code    Subjective:   Patient seen and examined at bedside, he states he has some dyspnea this morning but generally feels he is improving overall.  He is not sure he is ready to go home because he thinks he will have marked dyspnea need to return to the hospital.  We will continue current course of therapy.  Patient has no additional complaints though he does state he coughed up some yellow  phlegm today when it was clear yesterday    Objective:     Vitals:   Temp (24hrs), Av °F (36.7 °C), Min:97.5 °F (36.4 °C), Max:98.4 °F (36.9 °C)    Temp:  [97.5 °F (36.4 °C)-98.4 °F (36.9 °C)] 98.1 °F (36.7 °C)  HR:  [91-97] 91  Resp:  [16-18] 17  BP: (119-135)/(71-78) 135/78  SpO2:  [93 %-96 %] 95 %  Body mass index is 22.98 kg/m².     Input and Output Summary (last 24 hours):     Intake/Output Summary (Last 24 hours) at 2024 1526  Last data filed at 2024 0615  Gross per 24 hour   Intake 360 ml   Output --   Net 360 ml       Physical Exam:   Physical Exam  Vitals and nursing note reviewed.   Constitutional:       General: He is not in acute distress.     Appearance: He is well-developed. He is not toxic-appearing or diaphoretic.   HENT:      Head: Normocephalic and atraumatic.      Mouth/Throat:      Mouth: Mucous membranes are moist.   Eyes:      General: No scleral icterus.     Extraocular Movements: Extraocular movements intact.      Conjunctiva/sclera: Conjunctivae normal.   Cardiovascular:      Rate and Rhythm: Normal rate and regular rhythm.      Pulses: Normal pulses.      Heart sounds: No murmur heard.     No friction rub. No gallop.   Pulmonary:      Effort: Pulmonary effort is normal. No respiratory distress.      Breath sounds: Wheezing present. No rhonchi or rales.      Comments: Wheezing improved from yesterday  Abdominal:      General: Abdomen is flat. Bowel sounds are normal. There is no distension.      Palpations: Abdomen is soft. There is no mass.      Tenderness: There is no abdominal tenderness. There is no guarding.   Musculoskeletal:         General: No swelling.      Cervical back: Neck supple.      Right lower leg: No edema.      Left lower leg: No edema.   Lymphadenopathy:      Cervical: No cervical adenopathy.   Skin:     General: Skin is warm and dry.      Capillary Refill: Capillary refill takes less than 2 seconds.      Coloration: Skin is not jaundiced.      Findings: No  rash.   Neurological:      General: No focal deficit present.      Mental Status: He is alert and oriented to person, place, and time.      Sensory: No sensory deficit.      Motor: No weakness.   Psychiatric:         Mood and Affect: Mood normal.          Additional Data:     Labs:  Results from last 7 days   Lab Units 02/23/24  0559 02/22/24  0600 02/22/24  0053   WBC Thousand/uL 12.06*  --  10.83*   HEMOGLOBIN g/dL 14.0  --  15.5   HEMATOCRIT % 41.5  --  45.3   PLATELETS Thousands/uL 225   < > 251   NEUTROS PCT %  --   --  68   LYMPHS PCT %  --   --  23   MONOS PCT %  --   --  6   EOS PCT %  --   --  2    < > = values in this interval not displayed.     Results from last 7 days   Lab Units 02/23/24  0559   SODIUM mmol/L 137   POTASSIUM mmol/L 4.2   CHLORIDE mmol/L 103   CO2 mmol/L 24   BUN mg/dL 16   CREATININE mg/dL 0.88   ANION GAP mmol/L 10   CALCIUM mg/dL 9.9   GLUCOSE RANDOM mg/dL 123                 Results from last 7 days   Lab Units 02/22/24  0053   LACTIC ACID mmol/L 1.1   PROCALCITONIN ng/ml <0.05       Lines/Drains:  Invasive Devices       Peripheral Intravenous Line  Duration             Peripheral IV 02/22/24 Distal;Right;Upper;Ventral (anterior) Arm 1 day                    Imaging: Reviewed radiology reports from this admission including: chest xray    Recent Cultures (last 7 days):         Last 24 Hours Medication List:   Current Facility-Administered Medications   Medication Dose Route Frequency Provider Last Rate    acetaminophen  650 mg Oral Q6H PRN Waldemar Jaramillo      albuterol  2.5 mg Nebulization Q4H PRN Waldemar Jaramillo      azithromycin  500 mg Oral Q24H Waldemar Jaramillo      benzonatate  100 mg Oral TID PRN Jay Marcos MD      [START ON 2/24/2024] Fluticasone Furoate-Vilanterol  1 puff Inhalation Daily Jessica Torres MD      heparin (porcine)  5,000 Units Subcutaneous Q8H [facility] Beti Packer PA-C      levalbuterol  1.25 mg Nebulization TID Waldemar Jaramillo      lidocaine   1 patch Topical Daily Waldemar Jaramillo      methylPREDNISolone sodium succinate  40 mg Intravenous Q8H FARTUN Waldemar Jaramillo      oxyCODONE  5 mg Oral Q6H PRN Waldemar Jaramillo          Today, Patient Was Seen By: Waldemar Jaramillo    **Please Note: This note may have been constructed using a voice recognition system.**

## 2024-02-23 NOTE — PROGRESS NOTES
"Progress Note - Pulmonary   Ganesh Bowden 43 y.o. male MRN: [id number]  Unit/Bed#: -01 Encounter: [id number]    Assessment and Plan:    1.  Acute exacerbation of bronchial asthma: Mr. Ganesh Bowden previous history of asthma he is currently and is currently having an acute exacerbation most likely precipitated by acute tracheobronchitis.  Currently he is doing better with oral prednisone and nebulized levalbuterol.  He has been on azithromycin.  I will give him Breo 100 instead of Arnuity.  Monitor peak flows.  He did not have any significant eosinophilia.  His chest x-ray did not show any evidence of pneumonia.  He needs a PFT as an outpatient as well as pulmonary follow-up after discharge.     I spoke to the patient answered all questions.  I have advised him to follow-up in the pulmonary office after discharge.     I spoke to the primary team.     Thank you for allowing me to participate in the care of the patient.    Chief Complaint:   Shortness of breath; wheeze    Subjective:   He states that his shortness of breath is better.  He has occasional cough.  However he had some wheezing last night and this morning.  He is out of bed to chair.    Objective:     Vitals: Blood pressure 135/78, pulse 91, temperature 98.1 °F (36.7 °C), temperature source Oral, resp. rate 17, height 5' 10\" (1.778 m), weight 72.6 kg (160 lb 2 oz), SpO2 95%.,Body mass index is 22.98 kg/m².      Intake/Output Summary (Last 24 hours) at 2/23/2024 1512  Last data filed at 2/23/2024 0615  Gross per 24 hour   Intake 360 ml   Output --   Net 360 ml       Invasive Devices       Peripheral Intravenous Line  Duration             Peripheral IV 02/22/24 Distal;Right;Upper;Ventral (anterior) Arm 1 day                    Physical Exam: On clinical examination, he is hemodynamically stable and afebrile.  Comfortable on room air at rest.  HEENT: No conjunctival pallor no cyanosis.  Neck: No jugular venous distention no significant neck or " supraclavicular adenopathy.  Heart S1-S2 heard.  Chest air entry present bilaterally bilateral rhonchi.  No significant crackles.  Abdomen soft and nontender bowel sounds audible.  Neuro awake alert oriented.  Extremities no clubbing no edema.    Labs: I have personally reviewed pertinent lab results. sodium 137 potassium 4.2 bicarbonate 24 creatinine 0.88 white cell count 12.06 hemoglobin 14 platelet 225  Imaging and other studies: I have personally reviewed pertinent reports.   his  chest x-ray showed no evidence of pneumonia.

## 2024-02-23 NOTE — ASSESSMENT & PLAN NOTE
Lab Results   Component Value Date    EGFR 105 02/23/2024    EGFR 106 02/22/2024    EGFR 100 02/22/2024    CREATININE 0.88 02/23/2024    CREATININE 0.86 02/22/2024    CREATININE 0.93 02/22/2024   Patient following with nephrology as an outpatient, last seen in September 2022  Continue to monitor BMP

## 2024-02-23 NOTE — ASSESSMENT & PLAN NOTE
Patient with mildly elevated white cells of 10.83.  Low suspicion for infection, likely due to recent excessive albuterol inhaler use.  Plan  WBC 12 today, likely related to steroid use, check sputum culture

## 2024-02-23 NOTE — UTILIZATION REVIEW
[employer] not available due to electronic issues.     Initial Clinical Review    OBS 2/22 UPGRADED TO INPATIENT FOR CONTINUED TX of ACUTE EXACERBATION OF BRONCHIAL ASTHMA WITH IV STEROIDS, NEBS AND O2 MONITORING    Admission: Date/Time/Statement:   02/23/24 1553  Inpatient Admission  Once        Transfer Service: Hospitalist   Question Answer Comment   Level of Care Med Surg    Estimated length of stay More than 2 Midnights    Certification I certify that inpatient services are medically necessary for this patient for a duration of greater than two midnights. See H&P and MD Progress Notes for additional information about the patient's course of treatment.        02/23/24 1552     02/23/24 1553  Inpatient Admission  Once         02/23/24 1552   02/22/24 0704  Update level of care  Once         02/22/24 0703   02/22/24 0703  Place in Observation  Once         02/22/24 0702   02/22/24 0440  Inpatient Admission  Once         02/22/24 0440       ED Arrival Information       Expected   -    Arrival   2/22/2024 00:42    Acuity   Emergent              Means of arrival   Walk-In    Escorted by   Self    Service   Hospitalist    Admission type   Emergency              Arrival complaint   Shortness  of Breath             Chief Complaint   Patient presents with    Shortness of Breath     From waiting room. Severe SOB. Inhaler q20m w.o relief, was using nebulizer txmt but ran out last night. Hx asthma       Initial Presentation: 43 y.o. male history of asthma to ED with worsening breathing over the past few weeks. He got to the point where he was so dyspneic he was becoming lightheaded while walking. On presentation pt with severe wheezing, tachycardic, tachypneic, elevated BPH. He was given steroids and multiple nebs with improvement in his respiratory status. WBC 10.83. K 3.4. CXR with no acute findings. EKG: ST.   Admitted under observation to MS Unit with Asthma exacerbation-neurology consulted, patient improving with IV  steroids and scheduled/as needed breathing treatments, will continue this for now.  Flu/RSV/COVID-negative.  XR without infiltrate  Leukocytosis-mild at 10.8, likely stress-induced from asthma exacerbation, continue to monitor  CKD-renal function within baseline, continue to monitor  Dyslipidemia-not on any meds at home for this, outpatient follow-up    Pulmonology consult --  Acute exacerbation of bronchial asthma: He has acute exacerbation of bronchial asthma.  This is most likely precipitated by acute tracheobronchitis.  Currently he is doing better with oral prednisone and nebulized levalbuterol.  He has been started on azithromycin.  He is also on Arnuity Ellipta.  Monitor peak flows.  He did not have any significant eosinophilia.  His chest x-ray did not show any evidence of pneumonia.       Date: 2/23  -- UPGRADED TO INPATIENT  Pt states he still has some dyspnea but feels he is improving. + wheezing on exam. Remains on RA. WBC 12.06, likely r/t steroid use, check sputum culture.   Continue current course of IV steroids, nebs. Resume Breo.    Pulmonology consult -- Acute exacerbation of bronchial asthma: previous h/o asthma he is currently and is currently having an acute exacerbation most likely precipitated by acute tracheobronchitis.  Currently he is doing better with oral prednisone and nebulized levalbuterol.  He has been on azithromycin.  I will give him Breo 100 instead of Arnuity.  Monitor peak flows.  He did not have any significant eosinophilia.  His chest x-ray did not show any evidence of pneumonia.  He needs a PFT as an outpatient as well as pulmonary follow-up after discharge.         ED Triage Vitals   Temperature Pulse Respirations Blood Pressure SpO2   02/22/24 0100 02/22/24 0054 02/22/24 0054 02/22/24 0054 02/22/24 0054   98.2 °F (36.8 °C) (!) 111 (!) 30 (!) 137/106 98 %      Temp Source Heart Rate Source Patient Position - Orthostatic VS BP Location FiO2 (%)   02/22/24 0100 02/22/24 0054  02/22/24 0054 02/22/24 0054 --   Axillary Monitor Lying Left arm       Pain Score       02/22/24 0345       6          Wt Readings from Last 1 Encounters:   02/22/24 72.6 kg (160 lb 2 oz)     Additional Vital Signs:   Date/Time Temp Pulse Resp BP MAP (mmHg) SpO2 O2 Device Patient Position - Orthostatic VS   02/23/24 0830 -- -- -- -- -- 96 % None (Room air) --   02/23/24 0817 -- -- -- -- -- 96 % None (Room air) --   02/23/24 0725 98.4 °F (36.9 °C) 95 16 119/72 96 95 % None (Room air) Lying   02/23/24 0607 -- -- -- -- -- 94 % None (Room air) --   02/22/24 2222 97.5 °F (36.4 °C) 97 18 125/71 -- 95 % None (Room air) Sitting   02/22/24 2015 -- -- -- -- -- 94 % None (Room air) --   02/22/24 1833 -- -- -- -- -- 93 % None (Room air) --   02/22/24 1518 98.6 °F (37 °C) 99 20 131/80 -- 92 % -- Sitting   02/22/24 1359 -- -- -- -- -- 95 % None (Room air) --   02/22/24 0842 -- -- -- -- -- 94 % None (Room air) --   02/22/24 0830 -- -- -- -- -- 93 % None (Room air) --   02/22/24 0751 98.4 °F (36.9 °C) 100 20 144/88 -- 95 % -- Lying   02/22/24 0709 97.9 °F (36.6 °C) 91 20 124/90 104 94 % None (Room air) Lying   02/22/24 0630 -- 90 20 126/79 99 93 % None (Room air) Lying   02/22/24 0500 -- 97 22 136/85 104 93 % None (Room air) Lying   02/22/24 0430 -- 105 22 135/81 102 94 % None (Room air) Lying   02/22/24 0415 -- 102 19 -- -- 96 % -- --   02/22/24 0400 -- 88 22 141/80 100 97 % Aerosol mask --   02/22/24 0345 -- 97 -- -- -- 89 % Abnormal    None (Room air) --   SpO2: MD Nam. at 02/22/24 0345   02/22/24 0330 -- 98 16 114/80 92 94 % None (Room air) Lying   02/22/24 0300 -- 106 Abnormal  14 131/78 100 94 % None (Room air) Lying   02/22/24 0244 -- -- -- -- -- -- None (Room air) --   02/22/24 0230 -- 104 12 128/66 87 95 % None (Room air) Lying   02/22/24 0221 -- -- -- -- -- 96 % -- --   02/22/24 0200 -- 111 Abnormal  24 Abnormal  140/75 101 97 % Aerosol mask Lying   02/22/24 0130 -- 115 Abnormal  20 156/94 115 98 % Aerosol mask Lying    02/22/24 0100 98.2 °F (36.8 °C) 106 Abnormal  -- 148/95 114 99 % Aerosol mask Lying   02/22/24 0055 -- -- -- -- -- -- Aerosol mask --   02/22/24 0054 -- 111 Abnormal  30 Abnormal  137/106 Abnormal  -- 98 % None (Room air) Lying     Pertinent Labs/Diagnostic Test Results:   XR chest 1 view portable   ED Interpretation by Guilherme Castellon MD (02/22 0122)   No acute infiltrate      Final Result by Blair Meyer MD (02/22 1214)      No acute cardiopulmonary disease.        Results from last 7 days   Lab Units 02/22/24 0053   SARS-COV-2  Negative     Results from last 7 days   Lab Units 02/23/24  0559 02/22/24  0600 02/22/24 0053   WBC Thousand/uL 12.06*  --  10.83*   HEMOGLOBIN g/dL 14.0  --  15.5   HEMATOCRIT % 41.5  --  45.3   PLATELETS Thousands/uL 225 204 251   NEUTROS ABS Thousands/µL  --   --  7.36     Results from last 7 days   Lab Units 02/23/24  0559 02/22/24  0600 02/22/24 0053   SODIUM mmol/L 137 136 139   POTASSIUM mmol/L 4.2 3.5 3.4*   CHLORIDE mmol/L 103 104 103   CO2 mmol/L 24 18* 25   ANION GAP mmol/L 10 14 11   BUN mg/dL 16 14 14   CREATININE mg/dL 0.88 0.86 0.93   EGFR ml/min/1.73sq m 105 106 100   CALCIUM mg/dL 9.9 9.3 9.8     Results from last 7 days   Lab Units 02/23/24  0559 02/22/24  0600 02/22/24 0053   GLUCOSE RANDOM mg/dL 123 166* 126     Results from last 7 days   Lab Units 02/22/24  0455 02/22/24  0246 02/22/24 0053   HS TNI 0HR ng/L  --   --  6   HS TNI 2HR ng/L  --  5  --    HSTNI D2 ng/L  --  -1  --    HS TNI 4HR ng/L 4  --   --    HSTNI D4 ng/L -2  --   --      Results from last 7 days   Lab Units 02/22/24  0053   PROCALCITONIN ng/ml <0.05     Results from last 7 days   Lab Units 02/22/24  0053   LACTIC ACID mmol/L 1.1     Results from last 7 days   Lab Units 02/22/24  0053   INFLUENZA A PCR  Negative   INFLUENZA B PCR  Negative   RSV PCR  Negative     ED Treatment:   Medication Administration from 02/22/2024 0042 to 02/22/2024 0707         Date/Time Order Dose Route Action      02/22/2024 0050 EST albuterol inhalation solution 10 mg 10 mg Nebulization Given     02/22/2024 0050 EST ipratropium (ATROVENT) 0.02 % inhalation solution 1 mg 1 mg Nebulization Given     02/22/2024 0049 EST sodium chloride 0.9 % inhalation solution 12 mL 12 mL Nebulization Given     02/22/2024 0051 EST methylPREDNISolone sodium succinate (Solu-MEDROL) injection 125 mg 125 mg Intravenous Given     02/22/2024 0245 EST potassium chloride (Klor-Con M20) CR tablet 20 mEq 20 mEq Oral Given     02/22/2024 0203 EST magnesium sulfate 2 g/50 mL IVPB (premix) 2 g 2 g Intravenous New Bag     02/22/2024 0221 EST levalbuterol (XOPENEX) inhalation solution 1.25 mg 1.25 mg Nebulization Given     02/22/2024 0357 EST ketorolac (TORADOL) injection 15 mg 15 mg Intravenous Given     02/22/2024 0358 EST albuterol inhalation solution 5 mg 5 mg Nebulization Given     02/22/2024 0604 EST methylPREDNISolone sodium succinate (Solu-MEDROL) injection 40 mg 40 mg Intravenous Given     02/22/2024 0611 EST heparin (porcine) subcutaneous injection 5,000 Units 5,000 Units Subcutaneous Given       Past Medical History:   Diagnosis Date    Asthma     9/30/21  Last attack was greater than one month ago; triggered by seasonal allergies    Diverticulitis     last attack of diverticulitis in January 2021    Pneumothorax      Present on Admission:   Stage 2 chronic kidney disease   Dyslipidemia   Mild persistent asthma with acute exacerbation      Admitting Diagnosis: SOB (shortness of breath) [R06.02]  Severe persistent asthma with status asthmaticus [J45.52]  Mild persistent asthma with acute exacerbation [J45.31]  Age/Sex: 43 y.o. male  Admission Orders:  Scheduled Medications:  azithromycin, 500 mg, Oral, Q24H  fluticasone, 1 puff, Inhalation, Daily  heparin (porcine), 5,000 Units, Subcutaneous, Q8H FARTUN  levalbuterol, 1.25 mg, Nebulization, TID  lidocaine, 1 patch, Topical, Daily  methylPREDNISolone sodium succinate, 40 mg, Intravenous, Q8H  FARTUN    PRN Meds:  acetaminophen, 650 mg, Oral, Q6H PRN  albuterol, 2.5 mg, Nebulization, Q4H PRN  benzonatate, 100 mg, Oral, TID PRN  oxyCODONE, 5 mg, Oral, Q6H PRN            Network Utilization Review Department  ATTENTION: Please call with any questions or concerns to [phone] and carefully listen to the prompts so that you are directed to the right person. All voicemails are confidential.   For Discharge needs, contact Care Management DC Support Team at [phone] opt. 2  Send all requests for admission clinical reviews, approved or denied determinations and any other requests to dedicated fax number below belonging to the campus where the patient is receiving treatment. List of dedicated fax numbers for the Facilities:  FACILITY NAME UR FAX NUMBER   ADMISSION DENIALS (Administrative/Medical Necessity) [phone]   DISCHARGE SUPPORT TEAM (NETWORK) [phone]   PARENT CHILD HEALTH (Maternity/NICU/Pediatrics) [phone]   [facility] [phone]   [facility] [phone]   [facility] [phone]   [facility] [phone]   [facility] [phone]   [facility] [phone]   [facility] [phone]   [facility] [phone]   [facility] [phone]   [facility] [phone]   [facility] [phone]   [facility] [phone]

## 2024-02-23 NOTE — ASSESSMENT & PLAN NOTE
Patient with history of asthma presented to the ED severely short of breath, diaphoretic, wheezing, tachycardic.  Last hospitalization for asthma was in childhood. RR 30 oxygen saturation 98% on aerosol mask.  Reports cough productive of clear sputum and dyspnea x 2 weeks, reportedly using his rescue inhaler and albuterol nebulizer treatments once every hour for the past 2 weeks without relief.  ED treatment:  WASSERMAN neb x 1 hour  Xopenex x 2  Solu-Medrol 125 mg  Magnesium sulfate IVB x 2  Patient with improvement of symptoms, able to speak in full sentences.  Vital signs at time of admission 94% on room air, RR 22 heart rate 105  Flu/RSV/COVID panel negative  CXR negative for pneumothorax or pneumonia  Clinically improving, but patient still having some episodes of marked dyspnea.  Plan to continue current course and aim for discharge over weekend  Resume Breo

## 2024-02-23 NOTE — PLAN OF CARE
Problem: PAIN - ADULT  Goal: Verbalizes/displays adequate comfort level or baseline comfort level  Description: Interventions:  - Encourage patient to monitor pain and request assistance  - Assess pain using appropriate pain scale  - Administer analgesics based on type and severity of pain and evaluate response  - Implement non-pharmacological measures as appropriate and evaluate response  - Consider cultural and social influences on pain and pain management  - Notify physician/advanced practitioner if interventions unsuccessful or patient reports new pain  Outcome: Not Progressing     Problem: INFECTION - ADULT  Goal: Absence or prevention of progression during hospitalization  Description: INTERVENTIONS:  - Assess and monitor for signs and symptoms of infection  - Monitor lab/diagnostic results  - Monitor all insertion sites, i.e. indwelling lines, tubes, and drains  - Monitor endotracheal if appropriate and nasal secretions for changes in amount and color  - [place] appropriate cooling/warming therapies per order  - Administer medications as ordered  - Instruct and encourage patient and family to use good hand hygiene technique  - Identify and instruct in appropriate isolation precautions for identified infection/condition  Outcome: Not Progressing     Problem: DISCHARGE PLANNING  Goal: Discharge to home or other facility with appropriate resources  Description: INTERVENTIONS:  - Identify barriers to discharge w/patient and caregiver  - Arrange for needed discharge resources and transportation as appropriate  - Identify discharge learning needs (meds, wound care, etc.)  - Arrange for interpretive services to assist at discharge as needed  - Refer to Case Management Department for coordinating discharge planning if the patient needs post-hospital services based on physician/advanced practitioner order or complex needs related to functional status, cognitive ability, or social support system  Outcome: Not  Progressing     Problem: RESPIRATORY - ADULT  Goal: Achieves optimal ventilation and oxygenation  Description: INTERVENTIONS:  - Assess for changes in respiratory status  - Assess for changes in mentation and behavior  - Position to facilitate oxygenation and minimize respiratory effort  - Oxygen administered by appropriate delivery if ordered  - Initiate smoking cessation education as indicated  - Encourage broncho-pulmonary hygiene including cough, deep breathe, Incentive Spirometry  - Assess the need for suctioning and aspirate as needed  - Assess and instruct to report SOB or any respiratory difficulty  - Respiratory Therapy support as indicated  Outcome: Not Progressing

## 2024-02-24 LAB
ANION GAP SERPL CALCULATED.3IONS-SCNC: 11 MMOL/L
BUN SERPL-MCNC: 17 MG/DL (ref 5–25)
CALCIUM SERPL-MCNC: 9.9 MG/DL (ref 8.4–10.2)
CHLORIDE SERPL-SCNC: 101 MMOL/L (ref 96–108)
CO2 SERPL-SCNC: 24 MMOL/L (ref 21–32)
CREAT SERPL-MCNC: 0.89 MG/DL (ref 0.6–1.3)
ERYTHROCYTE [DISTWIDTH] IN BLOOD BY AUTOMATED COUNT: 13.2 % (ref 11.6–15.1)
GFR SERPL CREATININE-BSD FRML MDRD: 104 ML/MIN/1.73SQ M
GLUCOSE SERPL-MCNC: 128 MG/DL (ref 65–140)
HCT VFR BLD AUTO: 42.3 % (ref 36.5–49.3)
HGB BLD-MCNC: 14.4 G/DL (ref 12–17)
MCH RBC QN AUTO: 31.2 PG (ref 26.8–34.3)
MCHC RBC AUTO-ENTMCNC: 34 G/DL (ref 31.4–37.4)
MCV RBC AUTO: 92 FL (ref 82–98)
PLATELET # BLD AUTO: 235 THOUSANDS/UL (ref 149–390)
PMV BLD AUTO: 10.6 FL (ref 8.9–12.7)
POTASSIUM SERPL-SCNC: 4.3 MMOL/L (ref 3.5–5.3)
RBC # BLD AUTO: 4.61 MILLION/UL (ref 3.88–5.62)
SODIUM SERPL-SCNC: 136 MMOL/L (ref 135–147)
WBC # BLD AUTO: 11.51 THOUSAND/UL (ref 4.31–10.16)

## 2024-02-24 PROCEDURE — 94150 VITAL CAPACITY TEST: CPT

## 2024-02-24 PROCEDURE — 94640 AIRWAY INHALATION TREATMENT: CPT

## 2024-02-24 PROCEDURE — 87070 CULTURE OTHR SPECIMN AEROBIC: CPT | Performed by: STUDENT IN AN ORGANIZED HEALTH CARE EDUCATION/TRAINING PROGRAM

## 2024-02-24 PROCEDURE — 94760 N-INVAS EAR/PLS OXIMETRY 1: CPT

## 2024-02-24 PROCEDURE — 80048 BASIC METABOLIC PNL TOTAL CA: CPT | Performed by: STUDENT IN AN ORGANIZED HEALTH CARE EDUCATION/TRAINING PROGRAM

## 2024-02-24 PROCEDURE — 85027 COMPLETE CBC AUTOMATED: CPT | Performed by: STUDENT IN AN ORGANIZED HEALTH CARE EDUCATION/TRAINING PROGRAM

## 2024-02-24 PROCEDURE — 99232 SBSQ HOSP IP/OBS MODERATE 35: CPT | Performed by: STUDENT IN AN ORGANIZED HEALTH CARE EDUCATION/TRAINING PROGRAM

## 2024-02-24 PROCEDURE — 87205 SMEAR GRAM STAIN: CPT | Performed by: STUDENT IN AN ORGANIZED HEALTH CARE EDUCATION/TRAINING PROGRAM

## 2024-02-24 RX ORDER — PREDNISONE 20 MG/1
40 TABLET ORAL DAILY
Status: DISCONTINUED | OUTPATIENT
Start: 2024-02-25 | End: 2024-02-25

## 2024-02-24 RX ADMIN — ACETAMINOPHEN 650 MG: 325 TABLET, FILM COATED ORAL at 19:41

## 2024-02-24 RX ADMIN — METHYLPREDNISOLONE SODIUM SUCCINATE 40 MG: 40 INJECTION, POWDER, FOR SOLUTION INTRAMUSCULAR; INTRAVENOUS at 14:56

## 2024-02-24 RX ADMIN — HEPARIN SODIUM 5000 UNITS: 5000 INJECTION INTRAVENOUS; SUBCUTANEOUS at 21:31

## 2024-02-24 RX ADMIN — LEVALBUTEROL HYDROCHLORIDE 1.25 MG: 1.25 SOLUTION RESPIRATORY (INHALATION) at 14:41

## 2024-02-24 RX ADMIN — HEPARIN SODIUM 5000 UNITS: 5000 INJECTION INTRAVENOUS; SUBCUTANEOUS at 01:53

## 2024-02-24 RX ADMIN — AZITHROMYCIN 500 MG: 500 TABLET, FILM COATED ORAL at 11:31

## 2024-02-24 RX ADMIN — LIDOCAINE 1 PATCH: 50 PATCH CUTANEOUS at 11:31

## 2024-02-24 RX ADMIN — BENZONATATE 100 MG: 100 CAPSULE ORAL at 19:41

## 2024-02-24 RX ADMIN — FLUTICASONE FUROATE AND VILANTEROL TRIFENATATE 1 PUFF: 100; 25 POWDER RESPIRATORY (INHALATION) at 07:47

## 2024-02-24 RX ADMIN — METHYLPREDNISOLONE SODIUM SUCCINATE 40 MG: 40 INJECTION, POWDER, FOR SOLUTION INTRAMUSCULAR; INTRAVENOUS at 06:06

## 2024-02-24 RX ADMIN — HEPARIN SODIUM 5000 UNITS: 5000 INJECTION INTRAVENOUS; SUBCUTANEOUS at 14:56

## 2024-02-24 RX ADMIN — LEVALBUTEROL HYDROCHLORIDE 1.25 MG: 1.25 SOLUTION RESPIRATORY (INHALATION) at 07:47

## 2024-02-24 RX ADMIN — LEVALBUTEROL HYDROCHLORIDE 1.25 MG: 1.25 SOLUTION RESPIRATORY (INHALATION) at 19:55

## 2024-02-24 RX ADMIN — HEPARIN SODIUM 5000 UNITS: 5000 INJECTION INTRAVENOUS; SUBCUTANEOUS at 06:06

## 2024-02-24 NOTE — PROGRESS NOTES
[facility]  Progress Note  Name: Ganesh Bowden I  MRN: [id number]  Unit/Bed#: -01 I Date of Admission: 2/22/2024   Date of Service: 2/24/2024 I Hospital Day: 2    Assessment/Plan   Acute hypokalemia  Assessment & Plan  Patient with potassium of 3.4, received potassium chloride 20 mEq in the ED  Repeat BMP and replete as needed    Leukocytosis  Assessment & Plan  Patient with mildly elevated white cells of 10.83.  Low suspicion for infection, likely due to recent excessive albuterol inhaler use.  Plan  WBC 11.5 today, likely related to steroid use, check sputum culture    Dyslipidemia  Assessment & Plan  Prior lipid panel in 2022, ordered by his PCP revealed elevated cholesterol, triglycerides, LDLs.  Currently does not take any medication for this, recommend follow-up outpatient PCP for further medical management    Stage 2 chronic kidney disease  Assessment & Plan  Lab Results   Component Value Date    EGFR 104 02/24/2024    EGFR 105 02/23/2024    EGFR 106 02/22/2024    CREATININE 0.89 02/24/2024    CREATININE 0.88 02/23/2024    CREATININE 0.86 02/22/2024   Patient following with nephrology as an outpatient, last seen in September 2022  Continue to monitor BMP    * Mild persistent asthma with acute exacerbation  Assessment & Plan  Patient with history of asthma presented to the ED severely short of breath, diaphoretic, wheezing, tachycardic.  Last hospitalization for asthma was in childhood. RR 30 oxygen saturation 98% on aerosol mask.  Reports cough productive of clear sputum and dyspnea x 2 weeks, reportedly using his rescue inhaler and albuterol nebulizer treatments once every hour for the past 2 weeks without relief.  ED treatment:  WASSERMAN neb x 1 hour  Xopenex x 2  Solu-Medrol 125 mg  Magnesium sulfate IVB x 2  Patient with improvement of symptoms, able to speak in full sentences.  Vital signs at time of admission 94% on room air, RR 22 heart rate 105  Flu/RSV/COVID panel  negative  CXR negative for pneumothorax or pneumonia  Clinically improving, but patient still having some episodes of marked dyspnea.  Plan to continue current course and aim for discharge over weekend  Resume Breo           VTE Pharmacologic Prophylaxis: VTE Score: 3 Moderate Risk (Score 3-4) - Pharmacological DVT Prophylaxis Ordered: heparin.    Mobility:   Basic Mobility Inpatient Raw Score: 24  JH-HLM Goal: 8: Walk 250 feet or more  JH-HLM Achieved: 8: Walk 250 feet ot more  HLM Goal achieved. Continue to encourage appropriate mobility.    Patient Centered Rounds: I performed bedside rounds with nursing staff today.   Discussions with Specialists or Other Care Team Provider: N/A    Education and Discussions with Family / Patient: Patient declined call to .     Total Time Spent on Date of Encounter in care of patient: 45 mins. This time was spent on one or more of the following: performing physical exam; counseling and coordination of care; obtaining or reviewing history; documenting in the medical record; reviewing/ordering tests, medications or procedures; communicating with other healthcare professionals and discussing with patient's family/caregivers.    Current Length of Stay: 2 day(s)  Current Patient Status: Inpatient   Certification Statement: The patient will continue to require additional inpatient hospital stay due to Asthma exacerbation  Discharge Plan: Anticipate discharge tomorrow to home.    Code Status: Level 1 - Full Code    Subjective:   Patient seen and examined at bedside, patient is resting comfortably in bed.  He is happy with the progress he is made and states he is feeling much better today.  Today he plans to ambulate in the hallway to try to increase his activity tolerance.  He thinks he will be prepared for discharge tomorrow    Objective:     Vitals:   Temp (24hrs), Av.9 °F (36.6 °C), Min:97.5 °F (36.4 °C), Max:98.2 °F (36.8 °C)    Temp:  [97.5 °F (36.4 °C)-98.2 °F  (36.8 °C)] 97.5 °F (36.4 °C)  HR:  [70-89] 89  Resp:  [18-20] 20  BP: (116-138)/(74-94) 116/74  SpO2:  [92 %-96 %] 92 %  Body mass index is 22.98 kg/m².     Input and Output Summary (last 24 hours):     Intake/Output Summary (Last 24 hours) at 2/24/2024 1558  Last data filed at 2/24/2024 0601  Gross per 24 hour   Intake 450 ml   Output --   Net 450 ml       Physical Exam:   Physical Exam  Vitals and nursing note reviewed.   Constitutional:       General: He is not in acute distress.     Appearance: He is well-developed. He is not toxic-appearing or diaphoretic.   HENT:      Head: Normocephalic and atraumatic.      Mouth/Throat:      Mouth: Mucous membranes are moist.   Eyes:      General: No scleral icterus.     Extraocular Movements: Extraocular movements intact.      Conjunctiva/sclera: Conjunctivae normal.   Cardiovascular:      Rate and Rhythm: Normal rate and regular rhythm.      Pulses: Normal pulses.      Heart sounds: No murmur heard.     No friction rub. No gallop.   Pulmonary:      Effort: Pulmonary effort is normal. No respiratory distress.      Breath sounds: Wheezing present. No rhonchi or rales.      Comments: Wheezing improved from yesterday  Abdominal:      General: Abdomen is flat. Bowel sounds are normal. There is no distension.      Palpations: Abdomen is soft. There is no mass.      Tenderness: There is no abdominal tenderness. There is no guarding.   Musculoskeletal:         General: No swelling.      Cervical back: Neck supple.      Right lower leg: No edema.      Left lower leg: No edema.   Lymphadenopathy:      Cervical: No cervical adenopathy.   Skin:     General: Skin is warm and dry.      Capillary Refill: Capillary refill takes less than 2 seconds.      Coloration: Skin is not jaundiced.      Findings: No rash.   Neurological:      General: No focal deficit present.      Mental Status: He is alert and oriented to person, place, and time.      Sensory: No sensory deficit.      Motor: No  weakness.   Psychiatric:         Mood and Affect: Mood normal.          Additional Data:     Labs:  Results from last 7 days   Lab Units 02/24/24  0519 02/22/24  0600 02/22/24  0053   WBC Thousand/uL 11.51*   < > 10.83*   HEMOGLOBIN g/dL 14.4   < > 15.5   HEMATOCRIT % 42.3   < > 45.3   PLATELETS Thousands/uL 235   < > 251   NEUTROS PCT %  --   --  68   LYMPHS PCT %  --   --  23   MONOS PCT %  --   --  6   EOS PCT %  --   --  2    < > = values in this interval not displayed.     Results from last 7 days   Lab Units 02/24/24  0519   SODIUM mmol/L 136   POTASSIUM mmol/L 4.3   CHLORIDE mmol/L 101   CO2 mmol/L 24   BUN mg/dL 17   CREATININE mg/dL 0.89   ANION GAP mmol/L 11   CALCIUM mg/dL 9.9   GLUCOSE RANDOM mg/dL 128                 Results from last 7 days   Lab Units 02/22/24  0053   LACTIC ACID mmol/L 1.1   PROCALCITONIN ng/ml <0.05       Lines/Drains:  Invasive Devices       Peripheral Intravenous Line  Duration             Peripheral IV 02/22/24 Distal;Right;Upper;Ventral (anterior) Arm 2 days                    Imaging: Reviewed radiology reports from this admission including: chest xray    Recent Cultures (last 7 days):         Last 24 Hours Medication List:   Current Facility-Administered Medications   Medication Dose Route Frequency Provider Last Rate    acetaminophen  650 mg Oral Q6H PRN Waldemar Jaramillo      albuterol  2.5 mg Nebulization Q4H PRN Waldemar Jaramillo      benzonatate  100 mg Oral TID PRN Jay Marcos MD      Fluticasone Furoate-Vilanterol  1 puff Inhalation Daily Jessica Torres MD      heparin (porcine)  5,000 Units Subcutaneous Q8H [facility] Beti Packer PA-C      levalbuterol  1.25 mg Nebulization TID Waldemar Jaramillo      lidocaine  1 patch Topical Daily Waldemar Jaramillo      oxyCODONE  5 mg Oral Q6H PRN Waldemar Jaramillo      predniSONE  40 mg Oral Daily Waldemar Jaramillo          Today, Patient Was Seen By: Waldemar Jaramillo    **Please Note: This note may have been constructed  using a voice recognition system.**

## 2024-02-24 NOTE — ASSESSMENT & PLAN NOTE
Patient with potassium of 3.4, received potassium chloride 20 mEq in the ED  Repeat BMP and replete as needed   F-up in 2-3 days for a recheck, earlier if excessive vomiting, diarrhea, fever >100.4

## 2024-02-24 NOTE — ASSESSMENT & PLAN NOTE
Lab Results   Component Value Date    EGFR 104 02/24/2024    EGFR 105 02/23/2024    EGFR 106 02/22/2024    CREATININE 0.89 02/24/2024    CREATININE 0.88 02/23/2024    CREATININE 0.86 02/22/2024   Patient following with nephrology as an outpatient, last seen in September 2022  Continue to monitor BMP

## 2024-02-24 NOTE — ASSESSMENT & PLAN NOTE
Patient with mildly elevated white cells of 10.83.  Low suspicion for infection, likely due to recent excessive albuterol inhaler use.  Plan  WBC 11.5 today, likely related to steroid use, check sputum culture

## 2024-02-24 NOTE — PLAN OF CARE
Problem: PAIN - ADULT  Goal: Verbalizes/displays adequate comfort level or baseline comfort level  Description: Interventions:  - Encourage patient to monitor pain and request assistance  - Assess pain using appropriate pain scale  - Administer analgesics based on type and severity of pain and evaluate response  - Implement non-pharmacological measures as appropriate and evaluate response  - Consider cultural and social influences on pain and pain management  - Notify physician/advanced practitioner if interventions unsuccessful or patient reports new pain  Outcome: Progressing     Problem: INFECTION - ADULT  Goal: Absence or prevention of progression during hospitalization  Description: INTERVENTIONS:  - Assess and monitor for signs and symptoms of infection  - Monitor lab/diagnostic results  - Monitor all insertion sites, i.e. indwelling lines, tubes, and drains  - Monitor endotracheal if appropriate and nasal secretions for changes in amount and color  - [place] appropriate cooling/warming therapies per order  - Administer medications as ordered  - Instruct and encourage patient and family to use good hand hygiene technique  - Identify and instruct in appropriate isolation precautions for identified infection/condition  Outcome: Progressing     Problem: DISCHARGE PLANNING  Goal: Discharge to home or other facility with appropriate resources  Description: INTERVENTIONS:  - Identify barriers to discharge w/patient and caregiver  - Arrange for needed discharge resources and transportation as appropriate  - Identify discharge learning needs (meds, wound care, etc.)  - Arrange for interpretive services to assist at discharge as needed  - Refer to Case Management Department for coordinating discharge planning if the patient needs post-hospital services based on physician/advanced practitioner order or complex needs related to functional status, cognitive ability, or social support system  Outcome: Progressing      Problem: RESPIRATORY - ADULT  Goal: Achieves optimal ventilation and oxygenation  Description: INTERVENTIONS:  - Assess for changes in respiratory status  - Assess for changes in mentation and behavior  - Position to facilitate oxygenation and minimize respiratory effort  - Oxygen administered by appropriate delivery if ordered  - Initiate smoking cessation education as indicated  - Encourage broncho-pulmonary hygiene including cough, deep breathe, Incentive Spirometry  - Assess the need for suctioning and aspirate as needed  - Assess and instruct to report SOB or any respiratory difficulty  - Respiratory Therapy support as indicated  Outcome: Progressing

## 2024-02-25 PROCEDURE — 94150 VITAL CAPACITY TEST: CPT

## 2024-02-25 PROCEDURE — 94760 N-INVAS EAR/PLS OXIMETRY 1: CPT

## 2024-02-25 PROCEDURE — 99232 SBSQ HOSP IP/OBS MODERATE 35: CPT | Performed by: STUDENT IN AN ORGANIZED HEALTH CARE EDUCATION/TRAINING PROGRAM

## 2024-02-25 PROCEDURE — 94640 AIRWAY INHALATION TREATMENT: CPT

## 2024-02-25 PROCEDURE — 87040 BLOOD CULTURE FOR BACTERIA: CPT | Performed by: INTERNAL MEDICINE

## 2024-02-25 RX ORDER — FAMOTIDINE 20 MG/1
20 TABLET, FILM COATED ORAL 2 TIMES DAILY
Status: DISCONTINUED | OUTPATIENT
Start: 2024-02-25 | End: 2024-02-29 | Stop reason: HOSPADM

## 2024-02-25 RX ORDER — METHYLPREDNISOLONE SODIUM SUCCINATE 40 MG/ML
40 INJECTION, POWDER, LYOPHILIZED, FOR SOLUTION INTRAMUSCULAR; INTRAVENOUS EVERY 12 HOURS SCHEDULED
Status: DISCONTINUED | OUTPATIENT
Start: 2024-02-25 | End: 2024-02-26

## 2024-02-25 RX ORDER — CALCIUM CARBONATE 500 MG/1
500 TABLET, CHEWABLE ORAL DAILY PRN
Status: DISCONTINUED | OUTPATIENT
Start: 2024-02-25 | End: 2024-02-29 | Stop reason: HOSPADM

## 2024-02-25 RX ADMIN — PREDNISONE 40 MG: 20 TABLET ORAL at 08:17

## 2024-02-25 RX ADMIN — LIDOCAINE 1 PATCH: 50 PATCH CUTANEOUS at 08:17

## 2024-02-25 RX ADMIN — LEVALBUTEROL HYDROCHLORIDE 1.25 MG: 1.25 SOLUTION RESPIRATORY (INHALATION) at 08:47

## 2024-02-25 RX ADMIN — HEPARIN SODIUM 5000 UNITS: 5000 INJECTION INTRAVENOUS; SUBCUTANEOUS at 21:05

## 2024-02-25 RX ADMIN — ALBUTEROL SULFATE 2.5 MG: 2.5 SOLUTION RESPIRATORY (INHALATION) at 02:30

## 2024-02-25 RX ADMIN — LEVALBUTEROL HYDROCHLORIDE 1.25 MG: 1.25 SOLUTION RESPIRATORY (INHALATION) at 14:15

## 2024-02-25 RX ADMIN — METHYLPREDNISOLONE SODIUM SUCCINATE 40 MG: 40 INJECTION, POWDER, FOR SOLUTION INTRAMUSCULAR; INTRAVENOUS at 20:58

## 2024-02-25 RX ADMIN — FLUTICASONE FUROATE AND VILANTEROL TRIFENATATE 1 PUFF: 100; 25 POWDER RESPIRATORY (INHALATION) at 08:47

## 2024-02-25 RX ADMIN — LEVALBUTEROL HYDROCHLORIDE 1.25 MG: 1.25 SOLUTION RESPIRATORY (INHALATION) at 19:41

## 2024-02-25 RX ADMIN — HEPARIN SODIUM 5000 UNITS: 5000 INJECTION INTRAVENOUS; SUBCUTANEOUS at 06:25

## 2024-02-25 RX ADMIN — FAMOTIDINE 20 MG: 20 TABLET ORAL at 18:06

## 2024-02-25 RX ADMIN — HEPARIN SODIUM 5000 UNITS: 5000 INJECTION INTRAVENOUS; SUBCUTANEOUS at 13:43

## 2024-02-25 RX ADMIN — ANTACID TABLETS 500 MG: 500 TABLET, CHEWABLE ORAL at 16:29

## 2024-02-25 NOTE — PLAN OF CARE
Problem: PAIN - ADULT  Goal: Verbalizes/displays adequate comfort level or baseline comfort level  Description: Interventions:  - Encourage patient to monitor pain and request assistance  - Assess pain using appropriate pain scale  - Administer analgesics based on type and severity of pain and evaluate response  - Implement non-pharmacological measures as appropriate and evaluate response  - Consider cultural and social influences on pain and pain management  - Notify physician/advanced practitioner if interventions unsuccessful or patient reports new pain  Outcome: Progressing     Problem: INFECTION - ADULT  Goal: Absence or prevention of progression during hospitalization  Description: INTERVENTIONS:  - Assess and monitor for signs and symptoms of infection  - Monitor lab/diagnostic results  - Monitor all insertion sites, i.e. indwelling lines, tubes, and drains  - Monitor endotracheal if appropriate and nasal secretions for changes in amount and color  - [place] appropriate cooling/warming therapies per order  - Administer medications as ordered  - Instruct and encourage patient and family to use good hand hygiene technique  - Identify and instruct in appropriate isolation precautions for identified infection/condition  Outcome: Progressing     Problem: DISCHARGE PLANNING  Goal: Discharge to home or other facility with appropriate resources  Description: INTERVENTIONS:  - Identify barriers to discharge w/patient and caregiver  - Arrange for needed discharge resources and transportation as appropriate  - Identify discharge learning needs (meds, wound care, etc.)  - Arrange for interpretive services to assist at discharge as needed  - Refer to Case Management Department for coordinating discharge planning if the patient needs post-hospital services based on physician/advanced practitioner order or complex needs related to functional status, cognitive ability, or social support system  Outcome: Progressing      Problem: Knowledge Deficit  Goal: Patient/family/caregiver demonstrates understanding of disease process, treatment plan, medications, and discharge instructions  Description: Complete learning assessment and assess knowledge base.  Interventions:  - Provide teaching at level of understanding  - Provide teaching via preferred learning methods  Outcome: Progressing     Problem: RESPIRATORY - ADULT  Goal: Achieves optimal ventilation and oxygenation  Description: INTERVENTIONS:  - Assess for changes in respiratory status  - Assess for changes in mentation and behavior  - Position to facilitate oxygenation and minimize respiratory effort  - Oxygen administered by appropriate delivery if ordered  - Initiate smoking cessation education as indicated  - Encourage broncho-pulmonary hygiene including cough, deep breathe, Incentive Spirometry  - Assess the need for suctioning and aspirate as needed  - Assess and instruct to report SOB or any respiratory difficulty  - Respiratory Therapy support as indicated  Outcome: Progressing

## 2024-02-25 NOTE — ASSESSMENT & PLAN NOTE
Patient with mildly elevated WBC of 10.83.  Low suspicion for infection, likely stress and steroid use  Continue to monitor

## 2024-02-25 NOTE — PLAN OF CARE
Problem: RESPIRATORY - ADULT  Goal: Achieves optimal ventilation and oxygenation  Description: INTERVENTIONS:  - Assess for changes in respiratory status  - Assess for changes in mentation and behavior  - Position to facilitate oxygenation and minimize respiratory effort  - Oxygen administered by appropriate delivery if ordered  - Initiate smoking cessation education as indicated  - Encourage broncho-pulmonary hygiene including cough, deep breathe, Incentive Spirometry  - Assess the need for suctioning and aspirate as needed  - Assess and instruct to report SOB or any respiratory difficulty  - Respiratory Therapy support as indicated  Outcome: Progressing      Problem: DISCHARGE PLANNING  Goal: Discharge to home or other facility with appropriate resources  Description: INTERVENTIONS:  - Identify barriers to discharge w/patient and caregiver  - Arrange for needed discharge resources and transportation as appropriate  - Identify discharge learning needs (meds, wound care, etc.)  - Arrange for interpretive services to assist at discharge as needed  - Refer to Case Management Department for coordinating discharge planning if the patient needs post-hospital services based on physician/advanced practitioner order or complex needs related to functional status, cognitive ability, or social support system  Outcome: Progressing      Problem: Knowledge Deficit  Goal: Patient/family/caregiver demonstrates understanding of disease process, treatment plan, medications, and discharge instructions  Description: Complete learning assessment and assess knowledge base.  Interventions:  - Provide teaching at level of understanding  - Provide teaching via preferred learning methods  Outcome: Progressing     Problem: PAIN - ADULT  Goal: Verbalizes/displays adequate comfort level or baseline comfort level  Description: Interventions:  - Encourage patient to monitor pain and request assistance  - Assess pain using appropriate pain  scale  - Administer analgesics based on type and severity of pain and evaluate response  - Implement non-pharmacological measures as appropriate and evaluate response  - Consider cultural and social influences on pain and pain management  - Notify physician/advanced practitioner if interventions unsuccessful or patient reports new pain  2/24/2024 2136 by Solange Ortiz RN  Outcome: Progressing  2/24/2024 2135 by Solange Ortiz, RN  Outcome: Progressing

## 2024-02-25 NOTE — PROGRESS NOTES
[facility]  Progress Note  Name: Ganesh Bowden I  MRN: [id number]  Unit/Bed#: -01 I Date of Admission: 2/22/2024   Date of Service: 2/25/2024 I Hospital Day: 3    Assessment/Plan   Acute hypokalemia  Assessment & Plan  Patient with potassium of 3.4, received potassium chloride 20 mEq in the ED  Repeat BMP and replete as needed    Leukocytosis  Assessment & Plan  Patient with mildly elevated WBC of 10.83.  Low suspicion for infection, likely stress and steroid use  Continue to monitor    Dyslipidemia  Assessment & Plan  Prior lipid panel in 2022, ordered by his PCP revealed elevated cholesterol, triglycerides, LDLs.  Currently does not take any medication for this, recommend follow-up outpatient PCP for further medical management    Stage 2 chronic kidney disease  Assessment & Plan  Lab Results   Component Value Date    EGFR 104 02/24/2024    EGFR 105 02/23/2024    EGFR 106 02/22/2024    CREATININE 0.89 02/24/2024    CREATININE 0.88 02/23/2024    CREATININE 0.86 02/22/2024   Patient following with nephrology as an outpatient, last seen in September 2022  Continue to monitor BMP    * Mild persistent asthma with acute exacerbation  Assessment & Plan  Patient with history of asthma presented to the ED severely short of breath, diaphoretic, wheezing, tachycardic.  Last hospitalization for asthma was in childhood. RR 30 oxygen saturation 98% on aerosol mask.  Reports cough productive of clear sputum and dyspnea x 2 weeks, reportedly using his rescue inhaler and albuterol nebulizer treatments once every hour for the past 2 weeks without relief.  ED treatment:  WASSERMAN neb x 1 hour  Xopenex x 2  Solu-Medrol 125 mg  Magnesium sulfate IVB x 2  Patient with improvement of symptoms, able to speak in full sentences.  Vital signs at time of admission 94% on room air, RR 22 heart rate 105  Flu/RSV/COVID panel negative  CXR negative for pneumothorax or pneumonia  Clinically improving, but patient still  having some episodes of marked dyspnea.  Plan to continue current course and aim for discharge over weekend  Resume Breo           VTE Pharmacologic Prophylaxis: VTE Score: 3 Moderate Risk (Score 3-4) - Pharmacological DVT Prophylaxis Ordered: heparin.    Mobility:   Basic Mobility Inpatient Raw Score: 24  JH-HLM Goal: 8: Walk 250 feet or more  JH-HLM Achieved: 8: Walk 250 feet ot more  HLM Goal achieved. Continue to encourage appropriate mobility.    Patient Centered Rounds: I performed bedside rounds with nursing staff today.   Discussions with Specialists or Other Care Team Provider: N/A    Education and Discussions with Family / Patient: Patient declined call to .     Total Time Spent on Date of Encounter in care of patient: 45 mins. This time was spent on one or more of the following: performing physical exam; counseling and coordination of care; obtaining or reviewing history; documenting in the medical record; reviewing/ordering tests, medications or procedures; communicating with other healthcare professionals and discussing with patient's family/caregivers.    Current Length of Stay: 3 day(s)  Current Patient Status: Inpatient   Certification Statement: The patient will continue to require additional inpatient hospital stay due to Asthma Exacerbation  Discharge Plan: Anticipate discharge tomorrow to home.    Code Status: Level 1 - Full Code    Subjective:   Patient seen and examined, overall patient states he is improving but he feels like he is having a mild asthma attack this morning.  He is unsure if he feels safe to return home    Objective:     Vitals:   Temp (24hrs), Av.9 °F (36.6 °C), Min:97.7 °F (36.5 °C), Max:98.1 °F (36.7 °C)    Temp:  [97.7 °F (36.5 °C)-98.1 °F (36.7 °C)] 97.7 °F (36.5 °C)  HR:  [70-73] 70  Resp:  [16-18] 16  BP: (121-125)/(67-82) 123/82  SpO2:  [92 %-98 %] 92 %  Body mass index is 22.98 kg/m².     Input and Output Summary (last 24 hours):   No intake or output  data in the 24 hours ending 02/25/24 1979    Physical Exam:   Physical Exam  Vitals and nursing note reviewed.   Constitutional:       General: He is not in acute distress.     Appearance: He is well-developed. He is not toxic-appearing or diaphoretic.   HENT:      Head: Normocephalic and atraumatic.      Mouth/Throat:      Mouth: Mucous membranes are moist.   Eyes:      General: No scleral icterus.     Extraocular Movements: Extraocular movements intact.      Conjunctiva/sclera: Conjunctivae normal.   Cardiovascular:      Rate and Rhythm: Normal rate and regular rhythm.      Pulses: Normal pulses.      Heart sounds: No murmur heard.     No friction rub. No gallop.   Pulmonary:      Effort: Pulmonary effort is normal. No respiratory distress.      Breath sounds: Wheezing present. No rhonchi or rales.      Comments: Increased from yesterday  Abdominal:      General: Abdomen is flat. Bowel sounds are normal. There is no distension.      Palpations: Abdomen is soft. There is no mass.      Tenderness: There is no abdominal tenderness. There is no guarding.   Musculoskeletal:         General: No swelling.      Cervical back: Neck supple.      Right lower leg: No edema.      Left lower leg: No edema.   Lymphadenopathy:      Cervical: No cervical adenopathy.   Skin:     General: Skin is warm and dry.      Capillary Refill: Capillary refill takes less than 2 seconds.      Coloration: Skin is not jaundiced.      Findings: No rash.   Neurological:      General: No focal deficit present.      Mental Status: He is alert and oriented to person, place, and time.      Sensory: No sensory deficit.      Motor: No weakness.   Psychiatric:         Mood and Affect: Mood normal.          Additional Data:     Labs:  Results from last 7 days   Lab Units 02/24/24  0519 02/22/24  0600 02/22/24  0053   WBC Thousand/uL 11.51*   < > 10.83*   HEMOGLOBIN g/dL 14.4   < > 15.5   HEMATOCRIT % 42.3   < > 45.3   PLATELETS Thousands/uL 235   < > 251    NEUTROS PCT %  --   --  68   LYMPHS PCT %  --   --  23   MONOS PCT %  --   --  6   EOS PCT %  --   --  2    < > = values in this interval not displayed.     Results from last 7 days   Lab Units 02/24/24  0519   SODIUM mmol/L 136   POTASSIUM mmol/L 4.3   CHLORIDE mmol/L 101   CO2 mmol/L 24   BUN mg/dL 17   CREATININE mg/dL 0.89   ANION GAP mmol/L 11   CALCIUM mg/dL 9.9   GLUCOSE RANDOM mg/dL 128                 Results from last 7 days   Lab Units 02/22/24  0053   LACTIC ACID mmol/L 1.1   PROCALCITONIN ng/ml <0.05       Lines/Drains:  Invasive Devices       Peripheral Intravenous Line  Duration             Peripheral IV 02/22/24 Distal;Right;Upper;Ventral (anterior) Arm 3 days                      Imaging: Reviewed radiology reports from this admission including: chest xray    Recent Cultures (last 7 days):         Last 24 Hours Medication List:   Current Facility-Administered Medications   Medication Dose Route Frequency Provider Last Rate    acetaminophen  650 mg Oral Q6H PRN Waldemar Jaramillo      albuterol  2.5 mg Nebulization Q4H PRN Waldemar Jaramillo      benzonatate  100 mg Oral TID PRN Jay Marcos MD      calcium carbonate  500 mg Oral Daily PRN Waldemar Jaramillo      famotidine  20 mg Oral BID Waldemar Jaramillo      Fluticasone Furoate-Vilanterol  1 puff Inhalation Daily Jessica Torres MD      heparin (porcine)  5,000 Units Subcutaneous Q8H [facility] Beti Packer PA-C      levalbuterol  1.25 mg Nebulization TID Waldemar Jaramillo      lidocaine  1 patch Topical Daily Waldemar Jaramillo      methylPREDNISolone sodium succinate  40 mg Intravenous Q12H [facility] Waldemar Jaramillo      oxyCODONE  5 mg Oral Q6H PRN Waldemar Jaramillo          Today, Patient Was Seen By: Waldemar Jaramillo    **Please Note: This note may have been constructed using a voice recognition system.**

## 2024-02-26 PROCEDURE — 99232 SBSQ HOSP IP/OBS MODERATE 35: CPT | Performed by: INTERNAL MEDICINE

## 2024-02-26 PROCEDURE — 99232 SBSQ HOSP IP/OBS MODERATE 35: CPT | Performed by: STUDENT IN AN ORGANIZED HEALTH CARE EDUCATION/TRAINING PROGRAM

## 2024-02-26 PROCEDURE — 94640 AIRWAY INHALATION TREATMENT: CPT

## 2024-02-26 PROCEDURE — 94760 N-INVAS EAR/PLS OXIMETRY 1: CPT

## 2024-02-26 PROCEDURE — 94150 VITAL CAPACITY TEST: CPT

## 2024-02-26 RX ORDER — PREDNISONE 20 MG/1
40 TABLET ORAL DAILY
Status: DISCONTINUED | OUTPATIENT
Start: 2024-02-27 | End: 2024-02-26

## 2024-02-26 RX ORDER — PREDNISONE 20 MG/1
40 TABLET ORAL DAILY
Status: DISCONTINUED | OUTPATIENT
Start: 2024-02-26 | End: 2024-02-27

## 2024-02-26 RX ADMIN — ACETAMINOPHEN 650 MG: 325 TABLET, FILM COATED ORAL at 20:18

## 2024-02-26 RX ADMIN — PREDNISONE 40 MG: 20 TABLET ORAL at 12:46

## 2024-02-26 RX ADMIN — BENZONATATE 100 MG: 100 CAPSULE ORAL at 17:10

## 2024-02-26 RX ADMIN — LIDOCAINE 1 PATCH: 50 PATCH CUTANEOUS at 09:46

## 2024-02-26 RX ADMIN — ACETAMINOPHEN 650 MG: 325 TABLET, FILM COATED ORAL at 02:46

## 2024-02-26 RX ADMIN — FAMOTIDINE 20 MG: 20 TABLET ORAL at 09:43

## 2024-02-26 RX ADMIN — HEPARIN SODIUM 5000 UNITS: 5000 INJECTION INTRAVENOUS; SUBCUTANEOUS at 06:39

## 2024-02-26 RX ADMIN — LEVALBUTEROL HYDROCHLORIDE 1.25 MG: 1.25 SOLUTION RESPIRATORY (INHALATION) at 07:32

## 2024-02-26 RX ADMIN — LEVALBUTEROL HYDROCHLORIDE 1.25 MG: 1.25 SOLUTION RESPIRATORY (INHALATION) at 20:00

## 2024-02-26 RX ADMIN — FAMOTIDINE 20 MG: 20 TABLET ORAL at 17:10

## 2024-02-26 RX ADMIN — HEPARIN SODIUM 5000 UNITS: 5000 INJECTION INTRAVENOUS; SUBCUTANEOUS at 14:04

## 2024-02-26 RX ADMIN — METHYLPREDNISOLONE SODIUM SUCCINATE 40 MG: 40 INJECTION, POWDER, FOR SOLUTION INTRAMUSCULAR; INTRAVENOUS at 09:47

## 2024-02-26 RX ADMIN — LEVALBUTEROL HYDROCHLORIDE 1.25 MG: 1.25 SOLUTION RESPIRATORY (INHALATION) at 14:58

## 2024-02-26 RX ADMIN — BENZONATATE 100 MG: 100 CAPSULE ORAL at 02:44

## 2024-02-26 RX ADMIN — FLUTICASONE FUROATE AND VILANTEROL TRIFENATATE 1 PUFF: 100; 25 POWDER RESPIRATORY (INHALATION) at 07:46

## 2024-02-26 RX ADMIN — HEPARIN SODIUM 5000 UNITS: 5000 INJECTION INTRAVENOUS; SUBCUTANEOUS at 22:37

## 2024-02-26 NOTE — PROGRESS NOTES
"Progress Note - Pulmonary   Ganesh Bowden 43 y.o. male MRN: [id number]  Unit/Bed#: -01 Encounter: [id number]    Assessment and Plan:    1.  Acute exacerbation of bronchial asthma: Mr. Ganesh Bowden has previous history of asthma.  Currently he is admitted with an acute exacerbation most likely precipitated by acute tracheobronchitis.  He has been doing well however he gets spasms of cough and wheezing.  He had an episode last night.  He has phlegm which is yellow in color.  No fever or chills.  He is steroid therapy was changed to IV Solu-Medrol.  He is on Breo 100 and levalbuterol nebulization.  He still has occasional bilateral rhonchi and his peak expiratory flow rate is 220.  Continue with steroid therapy.  Await sputum results.    2.  Gastroesophageal reflux disease: He has heartburn.  I have advised him to sleep with the head of the bed elevated.  He could benefit from PPI.    I had a long discussion with him and answered all his questions.  Advised him to follow-up with me in the pulmonary office after discharge.      I spoke to Dr. Jaramillo, the patient's hospitalist.    Thank you for allowing me to participate in the care of the patient.    Chief Complaint:   Wheezing; shortness of breath; cough    Subjective:   The patient states that his shortness of breath is better.  However he gets occasional spasms of cough and wheeze sometimes waking him up from sleep.  No fever or chills.  He has yellow phlegm.  The sputum culture is pending.  He has been changed to IV Solu-Medrol.    Objective:     Vitals: Blood pressure 120/80, pulse 89, temperature 97.9 °F (36.6 °C), temperature source Tympanic, resp. rate 18, height 5' 10\" (1.778 m), weight 72.6 kg (160 lb 2 oz), SpO2 96%.,Body mass index is 22.98 kg/m².      Intake/Output Summary (Last 24 hours) at 2/26/2024 0941  Last data filed at 2/26/2024 0825  Gross per 24 hour   Intake 240 ml   Output --   Net 240 ml       Invasive Devices       Peripheral Intravenous " Line  Duration             Peripheral IV 02/22/24 Distal;Right;Upper;Ventral (anterior) Arm 4 days                    Physical Exam: On clinical examination, he was hemodynamically stable and afebrile.  Comfortable on room air at rest.  Saturating well.  Peak expiratory flow rate 220.  HEENT: No conjunctival pallor no cyanosis.  Neck: No jugular venous distention no significant neck or supraclavicular adenopathy.  Heart S1-S2 heard.  Chest air entry present bilaterally rhonchi bilaterally.  No crackles.  Abdomen soft and nontender bowel sounds audible neuro awake alert oriented.  Extremities no clubbing no edema.    Labs: I have personally reviewed pertinent lab results. blood cultures negative.  Sputum culture pending  Imaging and other studies: I have personally reviewed pertinent reports.

## 2024-02-26 NOTE — PLAN OF CARE
Problem: RESPIRATORY - ADULT  Goal: Achieves optimal ventilation and oxygenation  Description: INTERVENTIONS:  - Assess for changes in respiratory status  - Assess for changes in mentation and behavior  - Position to facilitate oxygenation and minimize respiratory effort  - Oxygen administered by appropriate delivery if ordered  - Initiate smoking cessation education as indicated  - Encourage broncho-pulmonary hygiene including cough, deep breathe, Incentive Spirometry  - Assess the need for suctioning and aspirate as needed  - Assess and instruct to report SOB or any respiratory difficulty  - Respiratory Therapy support as indicated  Outcome: Progressing         Problem: INFECTION - ADULT  Goal: Absence or prevention of progression during hospitalization  Description: INTERVENTIONS:  - Assess and monitor for signs and symptoms of infection  - Monitor lab/diagnostic results  - Monitor all insertion sites, i.e. indwelling lines, tubes, and drains  - Monitor endotracheal if appropriate and nasal secretions for changes in amount and color  - [place] appropriate cooling/warming therapies per order  - Administer medications as ordered  - Instruct and encourage patient and family to use good hand hygiene technique  - Identify and instruct in appropriate isolation precautions for identified infection/condition  Outcome: Progressing         Problem: PAIN - ADULT  Goal: Verbalizes/displays adequate comfort level or baseline comfort level  Description: Interventions:  - Encourage patient to monitor pain and request assistance  - Assess pain using appropriate pain scale  - Administer analgesics based on type and severity of pain and evaluate response  - Implement non-pharmacological measures as appropriate and evaluate response  - Consider cultural and social influences on pain and pain management  - Notify physician/advanced practitioner if interventions unsuccessful or patient reports new pain  Outcome:  Progressing      Problem: Knowledge Deficit  Goal: Patient/family/caregiver demonstrates understanding of disease process, treatment plan, medications, and discharge instructions  Description: Complete learning assessment and assess knowledge base.  Interventions:  - Provide teaching at level of understanding  - Provide teaching via preferred learning methods  Outcome: Progressing

## 2024-02-27 LAB
ANION GAP SERPL CALCULATED.3IONS-SCNC: 11 MMOL/L
BACTERIA SPT RESP CULT: ABNORMAL
BUN SERPL-MCNC: 21 MG/DL (ref 5–25)
CALCIUM SERPL-MCNC: 9.5 MG/DL (ref 8.4–10.2)
CHLORIDE SERPL-SCNC: 102 MMOL/L (ref 96–108)
CO2 SERPL-SCNC: 24 MMOL/L (ref 21–32)
CREAT SERPL-MCNC: 1.17 MG/DL (ref 0.6–1.3)
DME PARACHUTE DELIVERY DATE ACTUAL: NORMAL
DME PARACHUTE DELIVERY DATE REQUESTED: NORMAL
DME PARACHUTE ITEM DESCRIPTION: NORMAL
DME PARACHUTE ORDER STATUS: NORMAL
DME PARACHUTE SUPPLIER NAME: NORMAL
DME PARACHUTE SUPPLIER PHONE: NORMAL
ERYTHROCYTE [DISTWIDTH] IN BLOOD BY AUTOMATED COUNT: 13.3 % (ref 11.6–15.1)
GFR SERPL CREATININE-BSD FRML MDRD: 75 ML/MIN/1.73SQ M
GLUCOSE SERPL-MCNC: 111 MG/DL (ref 65–140)
GRAM STN SPEC: ABNORMAL
HCT VFR BLD AUTO: 43.1 % (ref 36.5–49.3)
HGB BLD-MCNC: 14.5 G/DL (ref 12–17)
MAGNESIUM SERPL-MCNC: 1.9 MG/DL (ref 1.9–2.7)
MCH RBC QN AUTO: 31 PG (ref 26.8–34.3)
MCHC RBC AUTO-ENTMCNC: 33.6 G/DL (ref 31.4–37.4)
MCV RBC AUTO: 92 FL (ref 82–98)
PLATELET # BLD AUTO: 234 THOUSANDS/UL (ref 149–390)
PMV BLD AUTO: 10.1 FL (ref 8.9–12.7)
POTASSIUM SERPL-SCNC: 3.5 MMOL/L (ref 3.5–5.3)
RBC # BLD AUTO: 4.67 MILLION/UL (ref 3.88–5.62)
SODIUM SERPL-SCNC: 137 MMOL/L (ref 135–147)
WBC # BLD AUTO: 12.32 THOUSAND/UL (ref 4.31–10.16)

## 2024-02-27 PROCEDURE — 99232 SBSQ HOSP IP/OBS MODERATE 35: CPT | Performed by: INTERNAL MEDICINE

## 2024-02-27 PROCEDURE — 94760 N-INVAS EAR/PLS OXIMETRY 1: CPT

## 2024-02-27 PROCEDURE — 94150 VITAL CAPACITY TEST: CPT

## 2024-02-27 PROCEDURE — 80048 BASIC METABOLIC PNL TOTAL CA: CPT | Performed by: FAMILY MEDICINE

## 2024-02-27 PROCEDURE — 83735 ASSAY OF MAGNESIUM: CPT | Performed by: FAMILY MEDICINE

## 2024-02-27 PROCEDURE — 94640 AIRWAY INHALATION TREATMENT: CPT

## 2024-02-27 PROCEDURE — 85027 COMPLETE CBC AUTOMATED: CPT | Performed by: FAMILY MEDICINE

## 2024-02-27 PROCEDURE — 99233 SBSQ HOSP IP/OBS HIGH 50: CPT | Performed by: FAMILY MEDICINE

## 2024-02-27 RX ORDER — MONTELUKAST SODIUM 10 MG/1
10 TABLET ORAL
Status: DISCONTINUED | OUTPATIENT
Start: 2024-02-27 | End: 2024-02-29 | Stop reason: HOSPADM

## 2024-02-27 RX ORDER — METHYLPREDNISOLONE SODIUM SUCCINATE 40 MG/ML
40 INJECTION, POWDER, LYOPHILIZED, FOR SOLUTION INTRAMUSCULAR; INTRAVENOUS EVERY 8 HOURS SCHEDULED
Status: DISCONTINUED | OUTPATIENT
Start: 2024-02-27 | End: 2024-02-28

## 2024-02-27 RX ADMIN — FAMOTIDINE 20 MG: 20 TABLET ORAL at 17:06

## 2024-02-27 RX ADMIN — HEPARIN SODIUM 5000 UNITS: 5000 INJECTION INTRAVENOUS; SUBCUTANEOUS at 05:47

## 2024-02-27 RX ADMIN — HEPARIN SODIUM 5000 UNITS: 5000 INJECTION INTRAVENOUS; SUBCUTANEOUS at 22:36

## 2024-02-27 RX ADMIN — HEPARIN SODIUM 5000 UNITS: 5000 INJECTION INTRAVENOUS; SUBCUTANEOUS at 13:00

## 2024-02-27 RX ADMIN — LEVALBUTEROL HYDROCHLORIDE 1.25 MG: 1.25 SOLUTION RESPIRATORY (INHALATION) at 14:43

## 2024-02-27 RX ADMIN — METHYLPREDNISOLONE SODIUM SUCCINATE 40 MG: 40 INJECTION, POWDER, FOR SOLUTION INTRAMUSCULAR; INTRAVENOUS at 12:54

## 2024-02-27 RX ADMIN — ALBUTEROL SULFATE 2.5 MG: 2.5 SOLUTION RESPIRATORY (INHALATION) at 04:54

## 2024-02-27 RX ADMIN — LIDOCAINE 1 PATCH: 50 PATCH CUTANEOUS at 08:13

## 2024-02-27 RX ADMIN — LEVALBUTEROL HYDROCHLORIDE 1.25 MG: 1.25 SOLUTION RESPIRATORY (INHALATION) at 07:37

## 2024-02-27 RX ADMIN — FLUTICASONE FUROATE AND VILANTEROL TRIFENATATE 1 PUFF: 100; 25 POWDER RESPIRATORY (INHALATION) at 07:50

## 2024-02-27 RX ADMIN — MONTELUKAST 10 MG: 10 TABLET, FILM COATED ORAL at 22:36

## 2024-02-27 RX ADMIN — FAMOTIDINE 20 MG: 20 TABLET ORAL at 08:14

## 2024-02-27 RX ADMIN — LEVALBUTEROL HYDROCHLORIDE 1.25 MG: 1.25 SOLUTION RESPIRATORY (INHALATION) at 19:34

## 2024-02-27 RX ADMIN — METHYLPREDNISOLONE SODIUM SUCCINATE 40 MG: 40 INJECTION, POWDER, FOR SOLUTION INTRAMUSCULAR; INTRAVENOUS at 22:55

## 2024-02-27 RX ADMIN — PREDNISONE 40 MG: 20 TABLET ORAL at 08:14

## 2024-02-27 RX ADMIN — METHYLPREDNISOLONE SODIUM SUCCINATE 40 MG: 40 INJECTION, POWDER, FOR SOLUTION INTRAMUSCULAR; INTRAVENOUS at 17:06

## 2024-02-27 NOTE — ASSESSMENT & PLAN NOTE
Patient with mildly elevated WBC of 11.51.  Low suspicion for infection, likely stress and steroid use  Continue to monitor

## 2024-02-27 NOTE — PLAN OF CARE
Problem: INFECTION - ADULT  Goal: Absence or prevention of progression during hospitalization  Description: INTERVENTIONS:  - Assess and monitor for signs and symptoms of infection  - Monitor lab/diagnostic results  - Monitor all insertion sites, i.e. indwelling lines, tubes, and drains  - Monitor endotracheal if appropriate and nasal secretions for changes in amount and color  - [place] appropriate cooling/warming therapies per order  - Administer medications as ordered  - Instruct and encourage patient and family to use good hand hygiene technique  - Identify and instruct in appropriate isolation precautions for identified infection/condition  Outcome: Progressing     Problem: RESPIRATORY - ADULT  Goal: Achieves optimal ventilation and oxygenation  Description: INTERVENTIONS:  - Assess for changes in respiratory status  - Assess for changes in mentation and behavior  - Position to facilitate oxygenation and minimize respiratory effort  - Oxygen administered by appropriate delivery if ordered  - Initiate smoking cessation education as indicated  - Encourage broncho-pulmonary hygiene including cough, deep breathe, Incentive Spirometry  - Assess the need for suctioning and aspirate as needed  - Assess and instruct to report SOB or any respiratory difficulty  - Respiratory Therapy support as indicated  Outcome: Progressing

## 2024-02-27 NOTE — PROGRESS NOTES
VTE Pharmacologic Prophylaxis: VTE Score: 3 Moderate Risk (Score 3-4) - Pharmacological DVT Prophylaxis Ordered: heparin.    Mobility:   Basic Mobility Inpatient Raw Score: 24  JH-HLM Goal: 8: Walk 250 feet or more  JH-HLM Achieved: 7: Walk 25 feet or more  HLM Goal NOT achieved. Continue with multidisciplinary rounding and encourage appropriate mobility to improve upon HLM goals.    Patient Centered Rounds: I performed bedside rounds with nursing staff today.   Discussions with Specialists or Other Care Team Provider: Pulm    Education and Discussions with Family / Patient: Patient declined call to .     Total Time Spent on Date of Encounter in care of patient: 45 mins. This time was spent on one or more of the following: performing physical exam; counseling and coordination of care; obtaining or reviewing history; documenting in the medical record; reviewing/ordering tests, medications or procedures; communicating with other healthcare professionals and discussing with patient's family/caregivers.    Current Length of Stay: 4 day(s)  Current Patient Status: Inpatient   Certification Statement: The patient will continue to require additional inpatient hospital stay due to Dyspnea  Discharge Plan: Anticipate discharge tomorrow to home.    Code Status: Level 1 - Full Code    Subjective:   Seen and examined at bedside.  Patient states his respiratory status seems to be improving and he is having improved exercise tolerance.  He feels like he will be able to return home tomorrow    Objective:     Vitals:   Temp (24hrs), Av.3 °F (36.8 °C), Min:97.9 °F (36.6 °C), Max:98.5 °F (36.9 °C)    Temp:  [97.9 °F (36.6 °C)-98.5 °F (36.9 °C)] 98.5 °F (36.9 °C)  HR:  [73-89] 73  Resp:  [16-18] 18  BP: (120-136)/(58-80) 136/75  SpO2:  [93 %-98 %] 94 %  Body mass index is 22.98 kg/m².     Input and Output Summary (last 24 hours):     Intake/Output Summary (Last 24 hours) at 2024  Last data filed at  2/26/2024 1700  Gross per 24 hour   Intake 720 ml   Output --   Net 720 ml       Physical Exam:   Physical Exam  Vitals and nursing note reviewed.   Constitutional:       General: He is not in acute distress.     Appearance: He is well-developed. He is not toxic-appearing or diaphoretic.   HENT:      Head: Normocephalic and atraumatic.      Mouth/Throat:      Mouth: Mucous membranes are moist.   Eyes:      General: No scleral icterus.     Extraocular Movements: Extraocular movements intact.      Conjunctiva/sclera: Conjunctivae normal.   Cardiovascular:      Rate and Rhythm: Normal rate and regular rhythm.      Pulses: Normal pulses.      Heart sounds: No murmur heard.     No friction rub. No gallop.   Pulmonary:      Effort: Pulmonary effort is normal. No respiratory distress.      Breath sounds: Wheezing present. No rhonchi or rales.      Comments: Improved  Abdominal:      General: Abdomen is flat. Bowel sounds are normal. There is no distension.      Palpations: Abdomen is soft. There is no mass.      Tenderness: There is no abdominal tenderness. There is no guarding.   Musculoskeletal:         General: No swelling.      Cervical back: Neck supple.      Right lower leg: No edema.      Left lower leg: No edema.   Lymphadenopathy:      Cervical: No cervical adenopathy.   Skin:     General: Skin is warm and dry.      Capillary Refill: Capillary refill takes less than 2 seconds.      Coloration: Skin is not jaundiced.      Findings: No rash.   Neurological:      General: No focal deficit present.      Mental Status: He is alert and oriented to person, place, and time.      Sensory: No sensory deficit.      Motor: No weakness.   Psychiatric:         Mood and Affect: Mood normal.          Additional Data:     Labs:  Results from last 7 days   Lab Units 02/24/24  0519 02/22/24  0600 02/22/24  0053   WBC Thousand/uL 11.51*   < > 10.83*   HEMOGLOBIN g/dL 14.4   < > 15.5   HEMATOCRIT % 42.3   < > 45.3   PLATELETS  Thousands/uL 235   < > 251   NEUTROS PCT %  --   --  68   LYMPHS PCT %  --   --  23   MONOS PCT %  --   --  6   EOS PCT %  --   --  2    < > = values in this interval not displayed.     Results from last 7 days   Lab Units 02/24/24  0519   SODIUM mmol/L 136   POTASSIUM mmol/L 4.3   CHLORIDE mmol/L 101   CO2 mmol/L 24   BUN mg/dL 17   CREATININE mg/dL 0.89   ANION GAP mmol/L 11   CALCIUM mg/dL 9.9   GLUCOSE RANDOM mg/dL 128                 Results from last 7 days   Lab Units 02/22/24  0053   LACTIC ACID mmol/L 1.1   PROCALCITONIN ng/ml <0.05       Lines/Drains:  Invasive Devices       Peripheral Intravenous Line  Duration             Peripheral IV 02/22/24 Distal;Right;Upper;Ventral (anterior) Arm 4 days                    Imaging: Reviewed radiology reports from this admission including: chest xray    Recent Cultures (last 7 days):   Results from last 7 days   Lab Units 02/25/24 2045 02/24/24 2122   BLOOD CULTURE  Received in Microbiology Lab. Culture in Progress.  Received in Microbiology Lab. Culture in Progress.  --    SPUTUM CULTURE   --  Commensal respiratory elodia only; No significant growth of Staph aureus/MRSA or Pseudomonas aeruginosa.   GRAM STAIN RESULT   --  Rare Epithelial cells per low power field*  3+ Gram positive cocci in pairs, chains and clusters*  1+ Gram negative diplococci*  Rare Gram positive rods*  Rare Polys*       Last 24 Hours Medication List:   Current Facility-Administered Medications   Medication Dose Route Frequency Provider Last Rate    acetaminophen  650 mg Oral Q6H PRN Waldemar Jaramillo      albuterol  2.5 mg Nebulization Q4H PRN Waldemar Jaramillo      benzonatate  100 mg Oral TID PRN Jay Marcos MD      calcium carbonate  500 mg Oral Daily PRN Waldemar Jaramillo      famotidine  20 mg Oral BID Waldemar Jaramillo      Fluticasone Furoate-Vilanterol  1 puff Inhalation Daily Jessica Torres MD      heparin (porcine)  5,000 Units Subcutaneous Q8H [facility] Beti Packer,  DAGMAR      levalbuterol  1.25 mg Nebulization TID Waldemar Jaramillo      lidocaine  1 patch Topical Daily Waldemar Jaramillo      oxyCODONE  5 mg Oral Q6H PRN Waldemar Jaramillo      predniSONE  40 mg Oral Daily Waldemar Jaramillo          Today, Patient Was Seen By: Waldemar Jaramillo    **Please Note: This note may have been constructed using a voice recognition system.**[facility]  Progress Note  Name: Ganesh Bowden I  MRN: [id number]  Unit/Bed#: -01 I Date of Admission: 2/22/2024   Date of Service: 2/26/2024 I Hospital Day: 4    Assessment/Plan   Acute hypokalemia  Assessment & Plan  Improved, replete and monitor    Leukocytosis  Assessment & Plan  Patient with mildly elevated WBC of 11.51.  Low suspicion for infection, likely stress and steroid use  Continue to monitor    Dyslipidemia  Assessment & Plan  Prior lipid panel in 2022, ordered by his PCP revealed elevated cholesterol, triglycerides, LDLs.  Currently does not take any medication for this, recommend follow-up outpatient PCP for further medical management    Stage 2 chronic kidney disease  Assessment & Plan  Lab Results   Component Value Date    EGFR 104 02/24/2024    EGFR 105 02/23/2024    EGFR 106 02/22/2024    CREATININE 0.89 02/24/2024    CREATININE 0.88 02/23/2024    CREATININE 0.86 02/22/2024   Patient following with nephrology as an outpatient, last seen in September 2022  Continue to monitor BMP    * Mild persistent asthma with acute exacerbation  Assessment & Plan  Patient with history of asthma presented to the ED severely short of breath, diaphoretic, wheezing, tachycardic.  Last hospitalization for asthma was in childhood. RR 30 oxygen saturation 98% on aerosol mask.  Reports cough productive of clear sputum and dyspnea x 2 weeks, reportedly using his rescue inhaler and albuterol nebulizer treatments once every hour for the past 2 weeks without relief.  ED treatment:  WASSERMAN neb x 1 hour  Xopenex x 2  Solu-Medrol  125 mg  Magnesium sulfate IVB x 2  Patient with improvement of symptoms, able to speak in full sentences.  Vital signs at time of admission 94% on room air, RR 22 heart rate 105  Flu/RSV/COVID panel negative  CXR negative for pneumothorax or pneumonia  Clinically improving, but patient still having some episodes of marked dyspnea.  Plan to continue current course and aim for discharge tomorrow. IV steroids deescalated to PO prednisone  Resume Breo

## 2024-02-27 NOTE — ASSESSMENT & PLAN NOTE
Patient states he is not feeling better today.  Auscultation reveals moderate wheezing and chest tightness  Will switch p.o. steroids to IV 40 mg every 8 hours  Continue albuterol every 4 hours as needed  Continue fluticasone vilanterol puffer  Continue respiratory protocol  Encourage ambulation  Not hypoxic  Discussed with pulmonary attending

## 2024-02-27 NOTE — PROGRESS NOTES
"Progress Note - Pulmonary   Ganesh Bowden 43 y.o. male MRN: [id number]  Unit/Bed#: -01 Encounter: [id number]    Assessment and Plan:    1.  Acute exacerbation of bronchial asthma: Mr. Ganesh Bowden has previous history of asthma.  Currently he is admitted with an acute exacerbation most likely precipitated by acute tracheobronchitis.  He has been doing well however he gets spasms of cough and wheezing. He has phlegm which is yellow in color. No fever or chills.  His steroid therapy was changed to IV Solu-Medrol.  He is on Breo 100 and levalbuterol nebulization. His chest was clear to auscultation today.  His peak flows have improved.  We will add Singulair 10 mg at night.  He had symptoms last night most likely from aspiration.  He has been switched back to IV Solu-Medrol now.  We will continue to observe him closely.      2.  Gastroesophageal reflux disease: He has heartburn.  I have advised him to sleep with the head of the bed elevated.  He could benefit from PPI.     I had a long discussion with him and answered all his questions.  Advised him to follow-up with me in the pulmonary office after discharge.       I spoke to , the patient's hospitalist.     Thank you for allowing me to participate in the care of the patient.    Chief Complaint:   Shortness of breath; wheezing    Subjective:   The patient tried to sleep flat last night and woke up with shortness of breath during sleep.  I have advised him to sleep with the head of the bed elevated.  He states that his shortness of breath is overall better but he continues to have wheezing.  His peak flow has improved.  He denied any chest pain or palpitations.    Objective:     Vitals: Blood pressure 139/77, pulse 95, temperature 98.1 °F (36.7 °C), temperature source Oral, resp. rate 20, height 5' 10\" (1.778 m), weight 72.6 kg (160 lb 2 oz), SpO2 97%.,Body mass index is 22.98 kg/m².      Intake/Output Summary (Last 24 hours) at 2/27/2024 2041  Last " data filed at 2/27/2024 0821  Gross per 24 hour   Intake 473 ml   Output --   Net 473 ml       Invasive Devices       Peripheral Intravenous Line  Duration             Peripheral IV 02/27/24 Dorsal (posterior);Left Forearm <1 day                    Physical Exam: On clinical examination, he was hemodynamically stable and afebrile.  Comfortable on room air at rest.  HEENT: No conjunctival pallor no cyanosis.  Neck: No jugular venous distention no significant neck or supraclavicular adenopathy.  Heart S1-S2 heard.  Chest air entry present bilaterally no significant crackles or rhonchi.  Abdomen soft and nontender bowel sounds audible neuro awake alert oriented.  Extremities no clubbing no edema..  Peak expiratory flow rate 330    Labs: I have personally reviewed pertinent lab results. sodium 137 potassium 3.5 bicarbonate 24 creatinine 1.17 white cell count 12.32 hemoglobin 14.5 platelet 234.  Sputum culture pending  Imaging and other studies: I have personally reviewed pertinent reports.

## 2024-02-27 NOTE — PLAN OF CARE
Problem: PAIN - ADULT  Goal: Verbalizes/displays adequate comfort level or baseline comfort level  Description: Interventions:  - Encourage patient to monitor pain and request assistance  - Assess pain using appropriate pain scale  - Administer analgesics based on type and severity of pain and evaluate response  - Implement non-pharmacological measures as appropriate and evaluate response  - Consider cultural and social influences on pain and pain management  - Notify physician/advanced practitioner if interventions unsuccessful or patient reports new pain  Outcome: Progressing     Problem: INFECTION - ADULT  Goal: Absence or prevention of progression during hospitalization  Description: INTERVENTIONS:  - Assess and monitor for signs and symptoms of infection  - Monitor lab/diagnostic results  - Monitor all insertion sites, i.e. indwelling lines, tubes, and drains  - Monitor endotracheal if appropriate and nasal secretions for changes in amount and color  - [place] appropriate cooling/warming therapies per order  - Administer medications as ordered  - Instruct and encourage patient and family to use good hand hygiene technique  - Identify and instruct in appropriate isolation precautions for identified infection/condition  Outcome: Progressing     Problem: DISCHARGE PLANNING  Goal: Discharge to home or other facility with appropriate resources  Description: INTERVENTIONS:  - Identify barriers to discharge w/patient and caregiver  - Arrange for needed discharge resources and transportation as appropriate  - Identify discharge learning needs (meds, wound care, etc.)  - Arrange for interpretive services to assist at discharge as needed  - Refer to Case Management Department for coordinating discharge planning if the patient needs post-hospital services based on physician/advanced practitioner order or complex needs related to functional status, cognitive ability, or social support system  Outcome: Progressing      Problem: Knowledge Deficit  Goal: Patient/family/caregiver demonstrates understanding of disease process, treatment plan, medications, and discharge instructions  Description: Complete learning assessment and assess knowledge base.  Interventions:  - Provide teaching at level of understanding  - Provide teaching via preferred learning methods  Outcome: Progressing     Problem: RESPIRATORY - ADULT  Goal: Achieves optimal ventilation and oxygenation  Description: INTERVENTIONS:  - Assess for changes in respiratory status  - Assess for changes in mentation and behavior  - Position to facilitate oxygenation and minimize respiratory effort  - Oxygen administered by appropriate delivery if ordered  - Initiate smoking cessation education as indicated  - Encourage broncho-pulmonary hygiene including cough, deep breathe, Incentive Spirometry  - Assess the need for suctioning and aspirate as needed  - Assess and instruct to report SOB or any respiratory difficulty  - Respiratory Therapy support as indicated  Outcome: Progressing

## 2024-02-27 NOTE — ASSESSMENT & PLAN NOTE
Lab Results   Component Value Date    EGFR 104 02/24/2024    EGFR 105 02/23/2024    EGFR 106 02/22/2024    CREATININE 0.89 02/24/2024    CREATININE 0.88 02/23/2024    CREATININE 0.86 02/22/2024   Patient following with nephrology as an outpatient, last seen in September 2022  Stable renal functions

## 2024-02-27 NOTE — ASSESSMENT & PLAN NOTE
Patient:   LIZBETH WATT            MRN: [id number]            FIN: [id number]              Age:   81 years     Sex:  FEMALE     :  38   Associated Diagnoses:   None   Author:   SHANTELL CARTER     Chief Complaint:  Right arm pain  History of present illness:  Mrs. Watt is an 82 yo F with hx of prior ORIF 2019, complicated with MSSA infection, revised  2019, and most recently treated for a right humeral abscess which was aspirated and grew klebsiella, sent from clinic due to concern for worsening infection.  She says overall the pain in the right arm since she was discharged last week has remained about the same, no worse, as well as the area of redness from prior.  Denies fevers, chills.  No  cough, N/V, diarrhea, or dysuria.  She does have other pains in her left knee and left hip.  In clinic was noted to have abnormal labs and told to go to the hospital for concern for a worsening infection  In the ED, vitals afebrile, -140's systolic.  Labs with WBC 18k, hgb 8.3, plt 323, Na 132, K 6.1, creatinine 2.03.  Doppler of the right arm was negative for DVT.  Xray of the right humerus noted the stable lucency/nonunion mid humerus  fracture.  She recieved norco, avycaz, and IV fluids in the ED.  Ortho was consulted and she is admitted for further care.  Review of Systems:  Constitutional: Negative other than as per history of present illness  Skin: Negative other than as per history of present illness  Eyes: Negative other than as per history of present illness  ENT: Negative other than as per history of present illness  Endocrine: Negative other than as per history of present illness  Cardiovascular: Negative other than as per history of present illness  Respiratory: Negative other than as per history of present illness  Gastrointestinal: Negative other than as per history of present illness  Musculoskeletal: Negative other than as per history of present illness  Neurologic: Negative other  Patient with history of asthma presented to the ED severely short of breath, diaphoretic, wheezing, tachycardic.  Last hospitalization for asthma was in childhood. RR 30 oxygen saturation 98% on aerosol mask.  Reports cough productive of clear sputum and dyspnea x 2 weeks, reportedly using his rescue inhaler and albuterol nebulizer treatments once every hour for the past 2 weeks without relief.  ED treatment:  WASSERMAN neb x 1 hour  Xopenex x 2  Solu-Medrol 125 mg  Magnesium sulfate IVB x 2  Patient with improvement of symptoms, able to speak in full sentences.  Vital signs at time of admission 94% on room air, RR 22 heart rate 105  Flu/RSV/COVID panel negative  CXR negative for pneumothorax or pneumonia  Clinically improving, but patient still having some episodes of marked dyspnea.  Plan to continue current course and aim for discharge tomorrow. IV steroids deescalated to PO prednisone  Resume Breo   than as per history of present illness  Hematologic: Negative other than as per history of present illness  Psychiatric: Negative other than as per history of present illness  Past medical history:  Right Humeral Abscess s/p aspiration, Cx (+) MDR klebsiella  CAD  Chronic Diastolic HF  Paroxysmal Atrial Fibrillation  Diabetes  Hypertension  TIA  Peripheral Vascular Disease  CKD III  Hypothyroidism  Gout  GERD  Depression  Family history:   No early heart disease  Social history:  Alcohol  Details: Alcohol Abuse in Household: No.  Use: None.  Details: Alcohol Abuse in Household: No.  Use: Social Drinker like party or weddings.  Details: Alcohol Abuse in Household: No.  Use: None.  Exercise  Details: Exercise: Occasionally.; Comment(s): Goe to the gym occasionally on 17 floor of her apartment. Lately she has gone because she feels sick lately.  Details: Exercise: Never.  Substance Abuse  Details: Substance Abuse in Household: No.  Use: None.  Details: Substance Abuse in Household: No.  Use: Past.  Details: Use: None.  Details: Substance Abuse in Household: No.  Tobacco  Details[facility].  Smoked/Smokeless Tobacco Last 30 Days: No.  Smoking Tobacco Use: Never smoker.  Smokeless Tobacco Use Never.  Details[facility].  Smoked/Smokeless Tobacco Last 30 Days: No.  Smoking Tobacco Use: Never smoker.  Smokeless Tobacco Use Never.  Details: Smoked/Smokeless Tobacco Last 30 Days: No.  Smoking Tobacco Use: Never smoker.  Smokeless Tobacco Use Never.  Home Medications (29) Active  allopurinol oral 300 mg tablet 300 mg = 1 tab, Oral, Daily  amLODIPine oral 10 mg tablet 10 mg = 1 tab, Oral, Daily  atorvastatin oral 80 mg tablet 80 mg = 1 tab, Oral, Q Bedtime  Breo Ellipta inhaler oral 100-25 mcg/puff powder 1 puff, MDI/DPI, Daily  cholecalciferol (vitamin D3) oral 1,000 unit tablet 1,000 unit = 1 tab, Oral, Daily  Coreg oral 25 mg tablet 25 mg = 1 tab, Oral, Q12H  Doc-Q-Lax 50 mg-8.6 mg oral tablet 2  tab, Oral, Q Bedtime  doxycycline hyclate 100 mg oral tablet 100 mg = 1 tab, Oral, BID  doxycycline hyclate 100 mg oral tablet 100 mg = 1 tab, Oral, Daily  ferrous sulfate oral 325 mg tablet [65 mg iron] (Feosol) 325 mg = 1 tab, Oral, Daily  Florastor 250 mg oral capsule 250 mg = 1 cap, Oral, BID  gabapentin oral 100 mg capsule 100 mg = 1 cap, Oral, BID  glipiZIDE oral 2.5 mg XL tablet 2.5 mg = 1 tab, Oral, Daily  levothyroxine 100 mcg (0.1 mg) oral tablet 100 mcg = 1 tab, Oral, QAM at 6  lisinopril oral 40 mg tablet 40 mg = 1 tab, Oral, Daily  magnesium hydroxide oral 8% suspension 2.4 gm = 30 mL, PRN, Oral, Daily  MiraLax oral powder for solution 17 gm = 17 gm, Oral, Daily  montelukast oral 10 mg tablet 10 mg = 1 tab, Oral, Q Evening  Norco oral 325-5 mg tablet 1 tab, PRN, Oral, Q6H  nystatin topical 100,000 unit/gm powder (Mycostatin) 1 application, Topical, BID  pantoprazole oral 40 mg DR tablet 40 mg = 1 tab, Oral, Daily  Plavix oral 75 mg tablet 75 mg = 1 tab, Oral, Daily  potassium CHLORIDE oral 10 mEq ER tablet (KCl / K-Dur) 10 mEq = 1 tab, Oral, Daily  sertraline oral 100 mg tablet 100 mg = 1 tab, Oral, Daily  temazepam oral 15 mg capsule 15 mg = 1 cap, PRN, Oral, Q Bedtime  traMADol oral 50 mg tablet 50 mg = 1 tab, PRN, Oral, Q8H  Xarelto oral 15 mg tablet 15 mg = 1 tab, Oral, Daily [with dinner]  Zyprexa oral 15 mg tablet 1 tab, Oral, Q Bedtime  [RESTRICTED] ciprofloxacin oral 500 mg tablet (Cipro) 500 mg = 1 tab, Oral, Q12H  Medications (17) Active  Scheduled: (12)  amLODIPine  10 mg 1 tab, Oral, Daily  atorvastatin  80 mg 1 tab, Oral, Q Bedtime  carvedilol  25 mg 1 tab, Oral, Q12H  cholecalciferol  1,000 unit 1 tab, Oral, Daily  clopidogrel  75 mg 1 tab, Oral, Daily  ferrous sulfate  325 mg 1 tab, Oral, Daily  gabapentin  100 mg 1 cap, Oral, BID  levothyroxine  100 mcg, Oral, QAM at 6  montelukast  10 mg 1 tab, Oral, Q Evening  OLANZapine  15 mg 1 tab, Oral, Q Bedtime  pantoprazole  40 mg 1 tab, Oral,  Daily  sertraline  100 mg 1 tab, Oral, Daily  Continuous: (0)  PRN: (5)  Acetaminophen 325 mg tab  650 mg 2 tab, Oral, Q4H  acetaminophen-hydrocodone  1 tab, Oral, Q6H  magnesium hydroxide  2.4 gm 30 mL, Oral, Daily  temazepam  15 mg 1 cap, Oral, Q Bedtime  traMADol  50 mg 1 tab, Oral, Q8H   Allergies (1) Active Reaction  NKA None Documented  Vitals between:   15-BETTIE-2020 18:02:35   TO   16-JAN-2020 18:02:35                   LAST RESULT MINIMUM MAXIMUM  Temperature 36.5 36.5 36.5  Heart Rate 79 79 92  Respiratory Rate 16 16 16  NISBP           146 103 146  NIDBP           54 44 67  NIMBP           81 69 81  SpO2                    100 98 100  Physical exam:  General: No distress  HEENT: Normocephalic, EOMI, Moist mucosa  Neck: Supple  Chest: Unlabored breathing, breath sounds equal  Cardiac: No murmur, no rubs  Abdomen: Soft, nontender, no distension  Musculoskeletal: Right arm demarcated redness, tender swelling, warmth  Neurologic: No focal sensory or motor deficits  Vascular: Equal pulses in extremities  Skin: Normal turgor  Psychiatric: Cooperative  Labs:     Labs between:  15-BETTIE-2020 18:02 to 16-JAN-2020 18:02  CBC:                 WBC  HgB  Hct  Plt  MCV  RDW   16-JAN-2020 (H) 18.8  (L) 8.3  (L) 27.7  323  86.8  (H) 17.5   DIFF:                 Seg  Neutroph//ABS  Lymph//ABS  Mono//ABS  EOS/ABS  16-JAN-2020 NOT APPLICABLE  75 // (H) 14.2  14 // 2.6 3 // 0.6 4 // (H) 0.7  BMP:                 Na  Cl  BUN  Glu   16-JAN-2020                                     K  CO2  Cr  Ca                              (H) 5.6         BMP:                 Na  Cl  BUN  Glu   16-JAN-2020 (L) 132  102  (H) 86  (H) 124                              K  CO2  Cr  Ca                              (H) 6.1  (L) 18  (H) 2.03  9.3   Other Chem:             Mg  Phos  Triglycerides  GGTP  DirectBili                                      POC GLU:                 Latest Result  Latest Date  Minimum  Min Date  Maximum  Max Date                              (H) 127  16-JAN-2020 (H) 127  16-JAN-2020 80 16-JAN-2020                 Radiology:   Result title:  VASC EXT UPPR VENOUS DPLX RT  Result status:  Final  Verified by:  LESA GOODE on 01/16/2020 4:37  IMPRESSION:No evidence for right upper extremity deep venous thrombosis, with nonvisualization of the cephalic vein.ILESA MD, have reviewed the images and report and concur with these findings interpreted by MANJINDER ESCALONA MD.  Result title:  XR PELVIS 1V/XR FEMUR LT MIN 2V  Result status:  Final  Verified by:  EDIE HIDALGO on 01/16/2020 6:55  IMPRESSION:No acute fracture of the pelvis or left femur.Chronic fracture deformity of the left femoral head with superior lateral subluxation of the hip joint and adjacent chronic bony fragmentation.Degenerative changes in the lower lumbar spine.  Result title:  XR HUMERUS RT MIN 2V  Result status:  Final  Verified by:  BIBI RIDDLE on 01/16/2020 5:33  IMPRESSION: There is a lucency/nonunion fracture seen about the mid humerus, similar to previous.  Extensive orthopedic hardware seen about the majority of the humerus.  There is a long cortical side plate with numerous screws present.  Additional at least 3 smaller cortical side plates and screws seen more distally within the humerus.  There is at least one unincorporated screw seen within the mid humerus at the level previously seen nonunited  fracture.  There is heterotopic bone formation seen laterally to the previously noted nonunionized fracture.There is questionable, slight lucency seen around the proximal most screw within the proximal humerus on the lateral radiograph.  Some degree of loosening/motion, with infection not excluded.  Assessment/Plan:   Mrs. Watt is an 80 yo F with hx of prior ORIF 2/2019, complicated with MSSA infection, revised  7/2019, and most recently treated for a right humeral abscess which was aspirated and grew klebsiella, sent from clinic due to concern  for worsening infection.  #Right Humerus Infection due to Abscess, MDR Klebsiella (KPC).  Chronic Right Humerus Fracture with Nonunion  -S/p prior aspiration, Cx (+) KPC  -Returns with continued pain, worsening leukocytosis  -Ortho on consult  -Consult ID as well  -Resume ceftaz/avibactam for now  #Hyperkalemia  -Hold ACEI  -Resume IV fluids  -Repeat K later  #ASHLEY on CKD III  -Resume IV fluids  -Hold ACEI  -Repeat BMP  #CAD, Hypertensive Heart Disease  -On plavix, coreg, statin  #Paroxysmal Atrial Fibrillation  -Rate controlled on coreg  -Xarelto on hold pending any surgery or drainage plans  #Diabetes  -Hold oral meds  -Insulin sliding scale, accuchecks  #Dyslipidemia  -Resume statin  #Osteoarthritis  -Worsening left hip, knee pains  -Ortho evaluated  #Prior TIA  -Resume statin, plavix  #Hypothyroidism  -Resume synthroid  #Chronic Anemia  -No obvious bleeding  -Monitor H/H  Based on the patient's presentation on admission, I expect the patient to require at least 2 midnights of medically necessary Hospital services for the following reasons: recurrent right arm infection, septic bone infection  CODE STATUS: Full Code  DVT prophylaxis: xarelto on hold pending surgical opinion  PCP: LEN Nielson Hospitalist

## 2024-02-27 NOTE — ASSESSMENT & PLAN NOTE
Stable WBC count  Likely steroid related versus reactive from asthma exacerbation  Sputum culture reviewed growing 3+ gram-positive cocci  Blood cultures no growth  Will hold off on using any antibiotics at this time

## 2024-02-28 PROBLEM — J20.9 ACUTE TRACHEOBRONCHITIS: Status: ACTIVE | Noted: 2024-02-28

## 2024-02-28 PROBLEM — E87.6 ACUTE HYPOKALEMIA: Status: RESOLVED | Noted: 2024-02-22 | Resolved: 2024-02-28

## 2024-02-28 PROCEDURE — 99232 SBSQ HOSP IP/OBS MODERATE 35: CPT | Performed by: PHYSICIAN ASSISTANT

## 2024-02-28 PROCEDURE — 94760 N-INVAS EAR/PLS OXIMETRY 1: CPT

## 2024-02-28 PROCEDURE — 99232 SBSQ HOSP IP/OBS MODERATE 35: CPT | Performed by: INTERNAL MEDICINE

## 2024-02-28 PROCEDURE — 94150 VITAL CAPACITY TEST: CPT

## 2024-02-28 PROCEDURE — 94640 AIRWAY INHALATION TREATMENT: CPT

## 2024-02-28 RX ORDER — DIPHENHYDRAMINE HYDROCHLORIDE 50 MG/ML
25 INJECTION INTRAMUSCULAR; INTRAVENOUS EVERY 6 HOURS PRN
Status: DISCONTINUED | OUTPATIENT
Start: 2024-02-28 | End: 2024-02-29 | Stop reason: HOSPADM

## 2024-02-28 RX ORDER — PREDNISONE 20 MG/1
40 TABLET ORAL DAILY
Status: DISCONTINUED | OUTPATIENT
Start: 2024-02-29 | End: 2024-02-29 | Stop reason: HOSPADM

## 2024-02-28 RX ADMIN — METHYLPREDNISOLONE SODIUM SUCCINATE 40 MG: 40 INJECTION, POWDER, FOR SOLUTION INTRAMUSCULAR; INTRAVENOUS at 06:30

## 2024-02-28 RX ADMIN — LEVALBUTEROL HYDROCHLORIDE 1.25 MG: 1.25 SOLUTION RESPIRATORY (INHALATION) at 14:44

## 2024-02-28 RX ADMIN — HEPARIN SODIUM 5000 UNITS: 5000 INJECTION INTRAVENOUS; SUBCUTANEOUS at 21:01

## 2024-02-28 RX ADMIN — LIDOCAINE 1 PATCH: 50 PATCH CUTANEOUS at 09:13

## 2024-02-28 RX ADMIN — DIPHENHYDRAMINE HYDROCHLORIDE 25 MG: 50 INJECTION, SOLUTION INTRAMUSCULAR; INTRAVENOUS at 20:49

## 2024-02-28 RX ADMIN — LEVALBUTEROL HYDROCHLORIDE 1.25 MG: 1.25 SOLUTION RESPIRATORY (INHALATION) at 08:19

## 2024-02-28 RX ADMIN — LEVALBUTEROL HYDROCHLORIDE 1.25 MG: 1.25 SOLUTION RESPIRATORY (INHALATION) at 20:07

## 2024-02-28 RX ADMIN — FAMOTIDINE 20 MG: 20 TABLET ORAL at 17:29

## 2024-02-28 RX ADMIN — FAMOTIDINE 20 MG: 20 TABLET ORAL at 09:13

## 2024-02-28 RX ADMIN — HEPARIN SODIUM 5000 UNITS: 5000 INJECTION INTRAVENOUS; SUBCUTANEOUS at 06:29

## 2024-02-28 RX ADMIN — MONTELUKAST 10 MG: 10 TABLET, FILM COATED ORAL at 21:01

## 2024-02-28 RX ADMIN — METHYLPREDNISOLONE SODIUM SUCCINATE 40 MG: 40 INJECTION, POWDER, FOR SOLUTION INTRAMUSCULAR; INTRAVENOUS at 14:22

## 2024-02-28 RX ADMIN — FLUTICASONE FUROATE AND VILANTEROL TRIFENATATE 1 PUFF: 100; 25 POWDER RESPIRATORY (INHALATION) at 08:34

## 2024-02-28 RX ADMIN — HEPARIN SODIUM 5000 UNITS: 5000 INJECTION INTRAVENOUS; SUBCUTANEOUS at 14:22

## 2024-02-28 NOTE — PLAN OF CARE
Problem: PAIN - ADULT  Goal: Verbalizes/displays adequate comfort level or baseline comfort level  Description: Interventions:  - Encourage patient to monitor pain and request assistance  - Assess pain using appropriate pain scale  - Administer analgesics based on type and severity of pain and evaluate response  - Implement non-pharmacological measures as appropriate and evaluate response  - Consider cultural and social influences on pain and pain management  - Notify physician/advanced practitioner if interventions unsuccessful or patient reports new pain  2/28/2024 1137 by Brenda Elise RN  Outcome: Progressing  2/28/2024 1136 by Brenda Elise RN  Outcome: Progressing     Problem: INFECTION - ADULT  Goal: Absence or prevention of progression during hospitalization  Description: INTERVENTIONS:  - Assess and monitor for signs and symptoms of infection  - Monitor lab/diagnostic results  - Monitor all insertion sites, i.e. indwelling lines, tubes, and drains  - Monitor endotracheal if appropriate and nasal secretions for changes in amount and color  - [place] appropriate cooling/warming therapies per order  - Administer medications as ordered  - Instruct and encourage patient and family to use good hand hygiene technique  - Identify and instruct in appropriate isolation precautions for identified infection/condition  2/28/2024 1137 by Brenda Elise RN  Outcome: Progressing  2/28/2024 1136 by Brenda Elise RN  Outcome: Progressing     Problem: DISCHARGE PLANNING  Goal: Discharge to home or other facility with appropriate resources  Description: INTERVENTIONS:  - Identify barriers to discharge w/patient and caregiver  - Arrange for needed discharge resources and transportation as appropriate  - Identify discharge learning needs (meds, wound care, etc.)  - Arrange for interpretive services to assist at discharge as needed  - Refer to Case Management Department for coordinating discharge planning  if the patient needs post-hospital services based on physician/advanced practitioner order or complex needs related to functional status, cognitive ability, or social support system  2/28/2024 1137 by Brenda Elise RN  Outcome: Progressing  2/28/2024 1136 by Brenda Elise RN  Outcome: Progressing     Problem: Knowledge Deficit  Goal: Patient/family/caregiver demonstrates understanding of disease process, treatment plan, medications, and discharge instructions  Description: Complete learning assessment and assess knowledge base.  Interventions:  - Provide teaching at level of understanding  - Provide teaching via preferred learning methods  2/28/2024 1137 by Brenda Elise RN  Outcome: Progressing  2/28/2024 1136 by Brenda Elise RN  Outcome: Progressing     Problem: RESPIRATORY - ADULT  Goal: Achieves optimal ventilation and oxygenation  Description: INTERVENTIONS:  - Assess for changes in respiratory status  - Assess for changes in mentation and behavior  - Position to facilitate oxygenation and minimize respiratory effort  - Oxygen administered by appropriate delivery if ordered  - Initiate smoking cessation education as indicated  - Encourage broncho-pulmonary hygiene including cough, deep breathe, Incentive Spirometry  - Assess the need for suctioning and aspirate as needed  - Assess and instruct to report SOB or any respiratory difficulty  - Respiratory Therapy support as indicated  2/28/2024 1137 by Brenda Elise RN  Outcome: Progressing  2/28/2024 1136 by Brenda Elise RN  Outcome: Progressing

## 2024-02-28 NOTE — PLAN OF CARE
Problem: PAIN - ADULT  Goal: Verbalizes/displays adequate comfort level or baseline comfort level  Description: Interventions:  - Encourage patient to monitor pain and request assistance  - Assess pain using appropriate pain scale  - Administer analgesics based on type and severity of pain and evaluate response  - Implement non-pharmacological measures as appropriate and evaluate response  - Consider cultural and social influences on pain and pain management  - Notify physician/advanced practitioner if interventions unsuccessful or patient reports new pain  Outcome: Progressing     Problem: INFECTION - ADULT  Goal: Absence or prevention of progression during hospitaliza  Problem: RESPIRATORY - ADULT  Goal: Achieves optimal ventilation and oxygenati  Description: INTERVENTIONS:  - Assess for changes in respiratory status  - Assess for changes in mentation and behavior  - Position to facilitate oxygenation and minimize respiratory effort  - Oxygen administered by appropriate delivery if ordered  - Initiate smoking cessation education as indicated  - Encourage broncho-pulmonary hygiene including cough, deep breathe, Incentive Spirometry  - Assess the need for suctioning and aspirate as needed  - Assess and instruct to report SOB or any respiratory difficulty  - Respiratory Therapy support as indicated  Outcome: Progressing   tion  Description: INTERVENTIONS:  - Assess and monitor for signs and symptoms of infection  - Monitor lab/diagnostic results  - Monitor all insertion sites, i.e. indwelling lines, tubes, and drains  - Monitor endotracheal if appropriate and nasal secretions for changes in amount and color  - [place] appropriate cooling/warming therapies per order  - Administer medications as ordered  - Instruct and encourage patient and family to use good hand hygiene technique  - Identify and instruct in appropriate isolation precautions for identified infection/condition  Outcome: Progressing

## 2024-02-28 NOTE — ASSESSMENT & PLAN NOTE
Patient was noted to be admitted on 2/22/2024 for acute asthma exacerbation and tracheobronchitis   Treated with IV steroids.    Received 125 mg in the ER followed by 80 mg total on 2/22 2/23 received a total of 120 mg  2/24 received a total of 80 mg  2/25 tapered and received 40 mg  2 /26 total 40 mg  2/27 worsened and dose increased - received a total of 120 mg    Now much improved on 2/28 but previously worsened with tapering.  Pulmonary following  Discuss with pulm but suspect decrease to q12 hr dosing today, receive IV x 1 dose on 2/29 and then plan for prednisone taper on discharge at that time  Neb obtained for pt by CM  Needs low cost medications as doesn't have coverage  Continue breo, singular and xopenex TID

## 2024-02-28 NOTE — ASSESSMENT & PLAN NOTE
Sputum culture with mixed respiratory elodia  Status post 3 days of azithromycin which was completed on 2/24/2024  No further indication for antibiotics  CXR negative on admission

## 2024-02-28 NOTE — PLAN OF CARE
Problem: PAIN - ADULT  Goal: Verbalizes/displays adequate comfort level or baseline comfort level  Description: Interventions:  - Encourage patient to monitor pain and request assistance  - Assess pain using appropriate pain scale  - Administer analgesics based on type and severity of pain and evaluate response  - Implement non-pharmacological measures as appropriate and evaluate response  - Consider cultural and social influences on pain and pain management  - Notify physician/advanced practitioner if interventions unsuccessful or patient reports new pain  Outcome: Not Progressing     Problem: INFECTION - ADULT  Goal: Absence or prevention of progression during hospitalization  Description: INTERVENTIONS:  - Assess and monitor for signs and symptoms of infection  - Monitor lab/diagnostic results  - Monitor all insertion sites, i.e. indwelling lines, tubes, and drains  - Monitor endotracheal if appropriate and nasal secretions for changes in amount and color  - [place] appropriate cooling/warming therapies per order  - Administer medications as ordered  - Instruct and encourage patient and family to use good hand hygiene technique  - Identify and instruct in appropriate isolation precautions for identified infection/condition  Outcome: Not Progressing     Problem: DISCHARGE PLANNING  Goal: Discharge to home or other facility with appropriate resources  Description: INTERVENTIONS:  - Identify barriers to discharge w/patient and caregiver  - Arrange for needed discharge resources and transportation as appropriate  - Identify discharge learning needs (meds, wound care, etc.)  - Arrange for interpretive services to assist at discharge as needed  - Refer to Case Management Department for coordinating discharge planning if the patient needs post-hospital services based on physician/advanced practitioner order or complex needs related to functional status, cognitive ability, or social support system  Outcome: Not  Progressing     Problem: RESPIRATORY - ADULT  Goal: Achieves optimal ventilation and oxygenation  Description: INTERVENTIONS:  - Assess for changes in respiratory status  - Assess for changes in mentation and behavior  - Position to facilitate oxygenation and minimize respiratory effort  - Oxygen administered by appropriate delivery if ordered  - Initiate smoking cessation education as indicated  - Encourage broncho-pulmonary hygiene including cough, deep breathe, Incentive Spirometry  - Assess the need for suctioning and aspirate as needed  - Assess and instruct to report SOB or any respiratory difficulty  - Respiratory Therapy support as indicated  Outcome: Not Progressing     Problem: Knowledge Deficit  Goal: Patient/family/caregiver demonstrates understanding of disease process, treatment plan, medications, and discharge instructions  Description: Complete learning assessment and assess knowledge base.  Interventions:  - Provide teaching at level of understanding  - Provide teaching via preferred learning methods  Outcome: Not Progressing

## 2024-02-28 NOTE — ASSESSMENT & PLAN NOTE
Last check on 2/27 with WBC 12.32 (ranging in 10-12 range)  Likely steroid induced  Sputum culture with mixed respiratory elodia.  Discussed this with patient.  Blood cultures no growth thus far at 48 hours

## 2024-02-28 NOTE — ASSESSMENT & PLAN NOTE
Currently does not take any medication for this  Recommend follow-up outpatient PCP for further medical management

## 2024-02-28 NOTE — PROGRESS NOTES
[facility]  Progress Note  Name: Ganesh Bowden I  MRN: [id number]  Unit/Bed#: -01 I Date of Admission: 2/22/2024   Date of Service: 2/28/2024 I Hospital Day: 6    Assessment/Plan   * Mild persistent asthma with acute exacerbation  Assessment & Plan  Patient was noted to be admitted on 2/22/2024 for acute asthma exacerbation and tracheobronchitis   Treated with IV steroids.    Received 125 mg in the ER followed by 80 mg total on 2/22 2/23 received a total of 120 mg  2/24 received a total of 80 mg  2/25 tapered and received 40 mg  2 /26 total 40 mg  2/27 worsened and dose increased - received a total of 120 mg    Now much improved on 2/28 but previously worsened with tapering.  Pulmonary following  Discuss with pulm but suspect decrease to q12 hr dosing today, receive IV x 1 dose on 2/29 and then plan for prednisone taper on discharge at that time  Neb obtained for pt by CM  Needs low cost medications as doesn't have coverage  Continue breo, singular and xopenex TID    Acute tracheobronchitis  Assessment & Plan  Sputum culture with mixed respiratory elodia  Status post 3 days of azithromycin which was completed on 2/24/2024  No further indication for antibiotics  CXR negative on admission    Leukocytosis  Assessment & Plan  Last check on 2/27 with WBC 12.32 (ranging in 10-12 range)  Likely steroid induced  Sputum culture with mixed respiratory elodia.  Discussed this with patient.  Blood cultures no growth thus far at 48 hours    Acute hypokalemia-resolved as of 2/28/2024  Assessment & Plan  Noted previously  Now resolved    Stage 2 chronic kidney disease  Assessment & Plan  Lab Results   Component Value Date    EGFR 75 02/27/2024    EGFR 104 02/24/2024    EGFR 105 02/23/2024    CREATININE 1.17 02/27/2024    CREATININE 0.89 02/24/2024    CREATININE 0.88 02/23/2024     Patient following with nephrology as an outpatient, last seen in September 2022  Creatinine did rise from 0.8 to 1.17 on   - will repeat in AM    Dyslipidemia  Assessment & Plan  Currently does not take any medication for this  Recommend follow-up outpatient PCP for further medical management           VTE Pharmacologic Prophylaxis: VTE Score: 3 Moderate Risk (Score 3-4) - Pharmacological DVT Prophylaxis Ordered: heparin.    Mobility:   Basic Mobility Inpatient Raw Score: 24  JH-HLM Goal: 8: Walk 250 feet or more  JH-HLM Achieved: 8: Walk 250 feet ot more  HLM Goal achieved. Continue to encourage appropriate mobility.    Patient Centered Rounds: I performed bedside rounds with nursing staff today.   Discussions with Specialists or Other Care Team Provider: nursing    Education and Discussions with Family / Patient: Patient declined call to .     Total Time Spent on Date of Encounter in care of patient:  mins. This time was spent on one or more of the following: performing physical exam; counseling and coordination of care; obtaining or reviewing history; documenting in the medical record; reviewing/ordering tests, medications or procedures; communicating with other healthcare professionals and discussing with patient's family/caregivers.    Current Length of Stay: 6 day(s)  Current Patient Status: Inpatient   Certification Statement: The patient will continue to require additional inpatient hospital stay due to IV steroid weaning  Discharge Plan: Anticipate discharge tomorrow to home.    Code Status: Level 1 - Full Code    Subjective:   Feeling better.  Reports that he was able to walk around the room today.  Still having occasional chest tightness.  Was worried about the gram-positive cocci that he saw on MyChart on his phone.    Objective:     Vitals:   Temp (24hrs), Av °F (36.7 °C), Min:97.7 °F (36.5 °C), Max:98.2 °F (36.8 °C)    Temp:  [97.7 °F (36.5 °C)-98.2 °F (36.8 °C)] 98.2 °F (36.8 °C)  HR:  [68-95] 68  Resp:  [18-20] 18  BP: (119-139)/(63-77) 119/63  SpO2:  [95 %-97 %] 95 %  Body mass index is 22.98  kg/m².     Input and Output Summary (last 24 hours):     Intake/Output Summary (Last 24 hours) at 2/28/2024 1237  Last data filed at 2/28/2024 1005  Gross per 24 hour   Intake 473 ml   Output --   Net 473 ml       Physical Exam:   Physical Exam  Vitals and nursing note reviewed.   Constitutional:       General: He is not in acute distress.     Appearance: Normal appearance. He is not ill-appearing or diaphoretic.   HENT:      Head: Normocephalic and atraumatic.      Mouth/Throat:      Mouth: Mucous membranes are moist.   Cardiovascular:      Rate and Rhythm: Normal rate and regular rhythm.   Pulmonary:      Effort: Pulmonary effort is normal.      Breath sounds: No stridor. Wheezing (right upper lung) present. No rhonchi or rales.      Comments: RA  Abdominal:      General: Bowel sounds are normal.      Palpations: Abdomen is soft.      Tenderness: There is no abdominal tenderness. There is no guarding.   Musculoskeletal:      Right lower leg: No edema.      Left lower leg: No edema.   Skin:     General: Skin is warm and dry.   Neurological:      Mental Status: He is alert.   Psychiatric:         Mood and Affect: Mood normal.         Behavior: Behavior normal.          Additional Data:     Labs:  Results from last 7 days   Lab Units 02/27/24  0951 02/22/24  0600 02/22/24  0053   WBC Thousand/uL 12.32*   < > 10.83*   HEMOGLOBIN g/dL 14.5   < > 15.5   HEMATOCRIT % 43.1   < > 45.3   PLATELETS Thousands/uL 234   < > 251   NEUTROS PCT %  --   --  68   LYMPHS PCT %  --   --  23   MONOS PCT %  --   --  6   EOS PCT %  --   --  2    < > = values in this interval not displayed.     Results from last 7 days   Lab Units 02/27/24  0951   SODIUM mmol/L 137   POTASSIUM mmol/L 3.5   CHLORIDE mmol/L 102   CO2 mmol/L 24   BUN mg/dL 21   CREATININE mg/dL 1.17   ANION GAP mmol/L 11   CALCIUM mg/dL 9.5   GLUCOSE RANDOM mg/dL 111                 Results from last 7 days   Lab Units 02/22/24  0053   LACTIC ACID mmol/L 1.1   PROCALCITONIN  ng/ml <0.05       Lines/Drains:  Invasive Devices       Peripheral Intravenous Line  Duration             Peripheral IV 02/27/24 Dorsal (posterior);Left Forearm <1 day                          Imaging: Reviewed radiology reports from this admission including: chest xray    Recent Cultures (last 7 days):   Results from last 7 days   Lab Units 02/25/24 2045 02/24/24 2122   BLOOD CULTURE  No Growth at 48 hrs.  No Growth at 48 hrs.  --    SPUTUM CULTURE   --  3+ Growth of   GRAM STAIN RESULT   --  Rare Epithelial cells per low power field*  3+ Gram positive cocci in pairs, chains and clusters*  1+ Gram negative diplococci*  Rare Gram positive rods*  Rare Polys*       Last 24 Hours Medication List:   Current Facility-Administered Medications   Medication Dose Route Frequency Provider Last Rate    acetaminophen  650 mg Oral Q6H PRN Waldemar Jaramillo      albuterol  2.5 mg Nebulization Q4H PRN Waldemar Jaramillo      benzonatate  100 mg Oral TID PRN Jay Marcos MD      calcium carbonate  500 mg Oral Daily PRN Waldemar Jaramillo      famotidine  20 mg Oral BID Waldemar Jaramillo      Fluticasone Furoate-Vilanterol  1 puff Inhalation Daily Jessica Torres MD      heparin (porcine)  5,000 Units Subcutaneous Q8H [facility] Beti Packer PA-C      levalbuterol  1.25 mg Nebulization TID Waldemar Jaramillo      lidocaine  1 patch Topical Daily Waldemar Jaramillo      methylPREDNISolone sodium succinate  40 mg Intravenous Q8H [facility] Bhaskar Montes De Oca MD      montelukast  10 mg Oral HS Jessica Torres MD      oxyCODONE  5 mg Oral Q6H PRN Waldemar Jaramillo          Today, Patient Was Seen By: Leslie Watson PA-C    **Please Note: This note may have been constructed using a voice recognition system.**

## 2024-02-28 NOTE — ASSESSMENT & PLAN NOTE
Lab Results   Component Value Date    EGFR 75 02/27/2024    EGFR 104 02/24/2024    EGFR 105 02/23/2024    CREATININE 1.17 02/27/2024    CREATININE 0.89 02/24/2024    CREATININE 0.88 02/23/2024     Patient following with nephrology as an outpatient, last seen in September 2022  Creatinine did rise from 0.8 to 1.17 on 2/27 - will repeat in AM

## 2024-02-28 NOTE — PROGRESS NOTES
"Progress Note - Pulmonary   Ganesh Bowden 43 y.o. male MRN: [id number]  Unit/Bed#: -01 Encounter: [id number]    Assessment and Plan:    1.  Acute exacerbation of bronchial asthma: Mr. Ganesh Bowden with previous history of asthma is currently admitted with an acute exacerbation most likely precipitated by acute tracheobronchitis.  He has been on treatment with IV Solu-Medrol.  He is on Breo 100 and levalbuterol nebulization. His chest was clear to auscultation today except for occasional rhonchi..  His peak flows have improved further.  We added Singulair 10 mg at night.  He is doing much better now.  He can be transitioned to oral prednisone a.m. if he continues to improve.      2.  Gastroesophageal reflux disease: He has heartburn and he is on PPI    I had a long discussion with him and answered all his questions.  Advised him to follow-up with me in the pulmonary office after discharge.         Thank you for allowing me to participate in the care of the patient.    Chief Complaint:   Shortness of breath    Subjective:   His shortness of breath and wheeze are much better now he did not have any problems last night.  His peak flows have improved to 350.    Objective:     Vitals: Blood pressure 137/76, pulse 80, temperature 97.7 °F (36.5 °C), temperature source Tympanic, resp. rate 16, height 5' 10\" (1.778 m), weight 72.6 kg (160 lb 2 oz), SpO2 98%.,Body mass index is 22.98 kg/m².      Intake/Output Summary (Last 24 hours) at 2/28/2024 2037  Last data filed at 2/28/2024 1005  Gross per 24 hour   Intake 473 ml   Output --   Net 473 ml       Invasive Devices       Peripheral Intravenous Line  Duration             Peripheral IV 02/27/24 Dorsal (posterior);Left Forearm 1 day                    Physical Exam: On clinical examination, he was hemodynamically stable and afebrile.  Comfortable on room air at rest.  HEENT: No conjunctival pallor no cyanosis.  Neck: No jugular venous distention no significant neck or " supraclavicular adenopathy.  Heart S1-S2 heard.  Chest air entry present bilaterally.  Occasional expiratory rhonchi bilaterally. peak expiratory flow rate 350.  Abdomen soft and nontender bowel sounds audible.  Neuro awake alert oriented.  Extremities no clubbing no edema.    Labs: I have personally reviewed pertinent lab results. no new lab work.  Imaging and other studies: I have personally reviewed pertinent reports.

## 2024-02-29 VITALS
HEIGHT: 70 IN | SYSTOLIC BLOOD PRESSURE: 138 MMHG | BODY MASS INDEX: 22.92 KG/M2 | OXYGEN SATURATION: 98 % | WEIGHT: 160.13 LBS | HEART RATE: 74 BPM | DIASTOLIC BLOOD PRESSURE: 70 MMHG | TEMPERATURE: 98.5 F | RESPIRATION RATE: 16 BRPM

## 2024-02-29 LAB
ANION GAP SERPL CALCULATED.3IONS-SCNC: 11 MMOL/L
BUN SERPL-MCNC: 29 MG/DL (ref 5–25)
CALCIUM SERPL-MCNC: 9.4 MG/DL (ref 8.4–10.2)
CHLORIDE SERPL-SCNC: 99 MMOL/L (ref 96–108)
CO2 SERPL-SCNC: 26 MMOL/L (ref 21–32)
CREAT SERPL-MCNC: 1.18 MG/DL (ref 0.6–1.3)
GFR SERPL CREATININE-BSD FRML MDRD: 75 ML/MIN/1.73SQ M
GLUCOSE SERPL-MCNC: 125 MG/DL (ref 65–140)
POTASSIUM SERPL-SCNC: 3.7 MMOL/L (ref 3.5–5.3)
SODIUM SERPL-SCNC: 136 MMOL/L (ref 135–147)

## 2024-02-29 PROCEDURE — 94640 AIRWAY INHALATION TREATMENT: CPT

## 2024-02-29 PROCEDURE — 99239 HOSP IP/OBS DSCHRG MGMT >30: CPT | Performed by: PHYSICIAN ASSISTANT

## 2024-02-29 PROCEDURE — 94761 N-INVAS EAR/PLS OXIMETRY MLT: CPT

## 2024-02-29 PROCEDURE — 99232 SBSQ HOSP IP/OBS MODERATE 35: CPT | Performed by: INTERNAL MEDICINE

## 2024-02-29 PROCEDURE — 80048 BASIC METABOLIC PNL TOTAL CA: CPT | Performed by: PHYSICIAN ASSISTANT

## 2024-02-29 PROCEDURE — 94760 N-INVAS EAR/PLS OXIMETRY 1: CPT

## 2024-02-29 RX ORDER — PREDNISONE 20 MG/1
TABLET ORAL DAILY
Qty: 9 TABLET | Refills: 0 | Status: SHIPPED | OUTPATIENT
Start: 2024-03-01 | End: 2024-03-07

## 2024-02-29 RX ORDER — FLUTICASONE FUROATE AND VILANTEROL 100; 25 UG/1; UG/1
1 POWDER RESPIRATORY (INHALATION) DAILY
Qty: 60 BLISTER | Refills: 0 | Status: SHIPPED | OUTPATIENT
Start: 2024-02-29 | End: 2024-02-29

## 2024-02-29 RX ORDER — ALBUTEROL SULFATE 2.5 MG/3ML
2.5 SOLUTION RESPIRATORY (INHALATION) EVERY 6 HOURS PRN
Qty: 15 ML | Refills: 0 | Status: SHIPPED | OUTPATIENT
Start: 2024-02-29

## 2024-02-29 RX ORDER — FLUTICASONE PROPIONATE AND SALMETEROL 100; 50 UG/1; UG/1
1 POWDER RESPIRATORY (INHALATION) 2 TIMES DAILY
Qty: 60 BLISTER | Refills: 0 | Status: SHIPPED | OUTPATIENT
Start: 2024-02-29

## 2024-02-29 RX ORDER — BENZONATATE 100 MG/1
100 CAPSULE ORAL 3 TIMES DAILY PRN
Qty: 20 CAPSULE | Refills: 0 | Status: SHIPPED | OUTPATIENT
Start: 2024-02-29 | End: 2024-02-29

## 2024-02-29 RX ORDER — ALBUTEROL SULFATE 2.5 MG/3ML
2.5 SOLUTION RESPIRATORY (INHALATION) EVERY 6 HOURS PRN
Qty: 15 ML | Refills: 0 | Status: CANCELLED | OUTPATIENT
Start: 2024-02-29

## 2024-02-29 RX ORDER — BUDESONIDE AND FORMOTEROL FUMARATE DIHYDRATE 80; 4.5 UG/1; UG/1
2 AEROSOL RESPIRATORY (INHALATION) 2 TIMES DAILY
Qty: 10.2 G | Refills: 0 | Status: SHIPPED | OUTPATIENT
Start: 2024-02-29 | End: 2024-02-29

## 2024-02-29 RX ORDER — ALBUTEROL SULFATE 90 UG/1
2 AEROSOL, METERED RESPIRATORY (INHALATION) EVERY 6 HOURS PRN
Qty: 6.7 G | Refills: 0 | Status: SHIPPED | OUTPATIENT
Start: 2024-02-29 | End: 2024-02-29

## 2024-02-29 RX ORDER — MONTELUKAST SODIUM 10 MG/1
10 TABLET ORAL
Qty: 30 TABLET | Refills: 0 | Status: SHIPPED | OUTPATIENT
Start: 2024-02-29 | End: 2024-03-30

## 2024-02-29 RX ADMIN — LEVALBUTEROL HYDROCHLORIDE 1.25 MG: 1.25 SOLUTION RESPIRATORY (INHALATION) at 14:54

## 2024-02-29 RX ADMIN — FAMOTIDINE 20 MG: 20 TABLET ORAL at 08:56

## 2024-02-29 RX ADMIN — LEVALBUTEROL HYDROCHLORIDE 1.25 MG: 1.25 SOLUTION RESPIRATORY (INHALATION) at 09:00

## 2024-02-29 RX ADMIN — FLUTICASONE FUROATE AND VILANTEROL TRIFENATATE 1 PUFF: 100; 25 POWDER RESPIRATORY (INHALATION) at 09:00

## 2024-02-29 RX ADMIN — PREDNISONE 40 MG: 20 TABLET ORAL at 08:55

## 2024-02-29 NOTE — ASSESSMENT & PLAN NOTE
Patient presented with 2-week history of SOB with productive cough, reports he ran out of his albuterol rescue inhaler. Known hx asthma, last hospitalization was in childhood.  Received 125 IV Solu-Medrol in the ED, started on scheduled IV Solu-Medrol   Initially slow to improve on IV steroids, dose tapered with improvement in symptoms  Home O2 eval: SpO2 >94% on RA, does not qualify for supplemental home oxygen.   Pulmonology following, continue PO prednisone taper, maintenance inhaler, Singulair and albuterol nebs PRN  Patient in process of applying for medical assistance, will send with Breo inhaler provided inpatient & given prescription for Wixela Inhub maintenance inhaler while this is in process. Patient agreeable with albuterol neb pricing $9.75.

## 2024-02-29 NOTE — ASSESSMENT & PLAN NOTE
Lab Results   Component Value Date    EGFR 75 02/27/2024    EGFR 104 02/24/2024    EGFR 105 02/23/2024    CREATININE 1.17 02/27/2024    CREATININE 0.89 02/24/2024    CREATININE 0.88 02/23/2024     Baseline creatinine in 2022 was around 1.4, no recent labs to compare  Seen by Dr. Graves from nephrology in 2022, no follow-up since  Cr increased 0.89->1.17 (2/27), although better than prior baseline  Obtain BMP in 1 week, F/U with PCP & re-establish care with nephrology outpatient

## 2024-02-29 NOTE — ASSESSMENT & PLAN NOTE
CXR: No acute cardiopulmonary disease. COVID/RSV/flu negative.  Sputum cx: Mixed respiratory elodia. Procal negative.   BC x2: NGTD (72 hrs). Leukocytosis likely steroid-induced.  Completed 3 days azithromycin, no indication for further abx

## 2024-02-29 NOTE — DISCHARGE SUMMARY
[facility]  Discharge- Ganesh Bowden 1980, 43 y.o. male MRN: [id number]  Unit/Bed#: MS Codi-01 Encounter: [id number]  Primary Care Provider: Beti Negron MD   Date and time admitted to hospital: 2/22/2024 12:43 AM    * Mild persistent asthma with acute exacerbation  Assessment & Plan  Patient presented with 2-week history of SOB with productive cough, reports he ran out of his albuterol rescue inhaler. Known hx asthma, last hospitalization was in childhood.  Received 125 IV Solu-Medrol in the ED, started on scheduled IV Solu-Medrol   Initially slow to improve on IV steroids, dose tapered with improvement in symptoms  Home O2 eval: SpO2 >94% on RA, does not qualify for supplemental home oxygen.   Pulmonology following, continue PO prednisone taper, maintenance inhaler, Singulair and albuterol nebs PRN  Patient in process of applying for medical assistance, will send with Breo inhaler provided inpatient & given prescription for Wixela Inhub maintenance inhaler while this is in process. Patient agreeable with albuterol neb pricing $9.75.    Stage 2 chronic kidney disease  Assessment & Plan  Lab Results   Component Value Date    EGFR 75 02/27/2024    EGFR 104 02/24/2024    EGFR 105 02/23/2024    CREATININE 1.17 02/27/2024    CREATININE 0.89 02/24/2024    CREATININE 0.88 02/23/2024     Baseline creatinine in 2022 was around 1.4, no recent labs to compare  Seen by Dr. Graves from nephrology in 2022, no follow-up since  Cr increased 0.89->1.17 (2/27), although better than prior baseline  Obtain BMP in 1 week, F/U with PCP & re-establish care with nephrology outpatient    Acute tracheobronchitis  Assessment & Plan  CXR: No acute cardiopulmonary disease. COVID/RSV/flu negative.  Sputum cx: Mixed respiratory elodia. Procal negative.   BC x2: NGTD (72 hrs). Leukocytosis likely steroid-induced.  Completed 3 days azithromycin, no indication for further abx    Dyslipidemia  Assessment &  Plan  Prior history of dyslipidemia, no longer on statin  Recommend F/U with PCP for routine lipid panel outpatient      Medical Problems       Resolved Problems  Date Reviewed: 2/29/2024            Resolved    Acute hypokalemia 2/28/2024     Resolved by  Leslie Watson PA-C        Discharging Physician / Practitioner: Radha Rudolph PA-C  PCP: Beti Negron MD  Admission Date:   Admission Orders (From admission, onward)       Ordered        02/23/24 1552  Inpatient Admission  Once            02/22/24 0702  Place in Observation  Once            02/22/24 0440  Inpatient Admission  Once                          Discharge Date: 02/29/24    Consultations During Hospital Stay:  Pulmonology    Procedures Performed:   None    Significant Findings / Test Results:   CXR (2/22): No acute cardiopulmonary disease.  BMP (2/29): Creatinine 1.81, GFR 75.    Incidental Findings:   None    Test Results Pending at Discharge (will require follow up):   Final blood culture results     Outpatient Tests Requested:  Follow-up with pulmonology for further management of asthma and tracheobronchitis  Follow-up with PCP for management chronic conditions, follow-up on progress of medical assistance application  Follow-up with PCP and/or nephrology for further management of CKD 2    Complications: None    Reason for Admission: Mild persistent asthma with acute exacerbation    Hospital Course:   Ganesh Bowden is a 43 y.o. male patient, PMH mild persistent asthma, CKD 2, dyslipidemia, who originally presented to the hospital on 2/22/2024 due to acute asthma exacerbation, presented with 2-week history of shortness of breath and productive cough, states he ran out of his albuterol inhaler. On arrival to ED, CXR did not show any acute findings, patient started on IV Solu-Medrol. Pulmonology was consulted, patient was initially slow to improve on IV steroids with slow titration of dosing with eventual improvement. Patient was treated  "with 3 days empiric azithromycin for tracheobronchitis, no evidence of pneumonia or infection with mixed respiratory elodia on sputum cultures. Patient stable to transition to oral prednisone with taper for the next week, needs to follow-up with pulmonology outpatient for formal PFTs. Patient currently does not have health insurance, case management assisted with providing resources to apply for medical assistance, provided remainder of Breo Ellipta inhaler that was utilized inpatient at discharge. Patient was also provided with nebulizer machine and albuterol nebulizers, patient agreeable with pricing but will need to await for medical assistance for more affordable maintenance inhaler in outpatient setting.    Additionally, patient was found to have elevated creatinine, although better than prior labs in 2022 when he was previously seen by nephrology.  Patient is no longer on statin, recommending following up with PCP for routine blood work, obtain BMP in 1 week and reestablish care with nephrologist outpatient.    Please see above list of diagnoses and related plan for additional information.     Condition at Discharge: stable    Discharge Day Visit / Exam:   Subjective: Patient is seen at bedside this a.m., denies any acute complaints and states he feels ready for discharge home. Long discussion with patient on importance of application for medical assistance given available maintenance inhalers are not very affordable, patient agreeable with plan.  Vitals: Blood Pressure: 138/70 (02/29/24 1457)  Pulse: 74 (02/29/24 1457)  Temperature: 98.5 °F (36.9 °C) (02/29/24 1457)  Temp Source: Tympanic (02/29/24 1457)  Respirations: 16 (02/29/24 1457)  Height: 5' 10\" (177.8 cm) (02/22/24 0751)  Weight - Scale: 72.6 kg (160 lb 2 oz) (02/22/24 0751)  SpO2: 98 % (02/29/24 1457)  Exam:   Physical Exam  Constitutional:       General: He is not in acute distress.     Appearance: He is not ill-appearing, toxic-appearing or " diaphoretic.   Cardiovascular:      Rate and Rhythm: Normal rate and regular rhythm.      Pulses: Normal pulses.      Heart sounds: Normal heart sounds.   Pulmonary:      Effort: Pulmonary effort is normal. No respiratory distress.      Breath sounds: Normal breath sounds.   Abdominal:      General: Bowel sounds are normal. There is no distension.      Palpations: Abdomen is soft.      Tenderness: There is no abdominal tenderness.   Musculoskeletal:         General: No swelling or tenderness.   Skin:     General: Skin is warm.   Neurological:      General: No focal deficit present.      Mental Status: He is alert.   Psychiatric:         Mood and Affect: Mood normal.         Behavior: Behavior normal.          Discussion with Family: Patient declined call to .     Discharge instructions/Information to patient and family:   See after visit summary for information provided to patient and family.      Provisions for Follow-Up Care:  See after visit summary for information related to follow-up care and any pertinent home health orders.      Mobility at time of Discharge:   Basic Mobility Inpatient Raw Score: 24  JH-HLM Goal: 8: Walk 250 feet or more  JH-HLM Achieved: 8: Walk 250 feet ot more  HLM Goal achieved. Continue to encourage appropriate mobility.     Disposition:   Home    Planned Readmission: None     Discharge Statement:  I spent 55 minutes discharging the patient. This time was spent on the day of discharge. I had direct contact with the patient on the day of discharge. Greater than 50% of the total time was spent examining patient, answering all patient questions, arranging and discussing plan of care with patient as well as directly providing post-discharge instructions.  Additional time then spent on discharge activities.    Discharge Medications:  See after visit summary for reconciled discharge medications provided to patient and/or family.      **Please Note: This note may have been  constructed using a voice recognition system**

## 2024-02-29 NOTE — RESPIRATORY THERAPY NOTE
Home Oxygen Qualifying Test     Patient name: Ganesh Bowden        : 1980   Date of Test:  2024  Diagnosis:    Home Oxygen Test:    **Medicare Guidelines require item(s) 1-5 on all ambulatory patients or 1 and 2 on non-ambulatory patients.    1. Baseline SPO2 on Room Air at rest 94 %   If <= 88% on Room Air add O2 via NC to obtain SpO2 >=88%. If LPM needed, document LPM  needed to reach =>88%    SPO2 during exertion on Room Air 94  %  During exertion monitor SPO2. If SPO2 increases >=89%, do not add supplemental oxygen    SPO2 on Oxygen at Rest  NA % at NA LPM    SPO2 during exertion on Oxygen NA % at NA LPM    Test performed during exertion activity. Ambulation     []  Supplemental Home Oxygen is indicated.    [x]  Client does not qualify for home oxygen.    Respiratory Additional Notes-     Patient received on room air saturating at 94%.  Testing procedure was explained and testing starting. Patient able to ambulate a distance of 800 feet without assistance.  A steady gait and pace was maintained. Patient did not take any breaks during testing and no S/S of distress noted. Lowest oxygen saturation was 94% on room air with exertion.    Patient does not qualify for supplemental oxygen.    Zak Clemons, RT

## 2024-02-29 NOTE — PROGRESS NOTES
"Progress Note - Pulmonary   Ganesh Bowden 43 y.o. male MRN: [id number]  Unit/Bed#: -01 Encounter: [id number]    Assessment and Plan:    1.  Acute exacerbation of bronchial asthma: Mr. Ganesh Bowden with previous history of asthma is currently admitted with an acute exacerbation most likely precipitated by acute tracheobronchitis.  He was on treatment with IV Solu-Medrol.  He is on Breo 100 and levalbuterol nebulization. His chest was clear to auscultation today was clear his peak flows have improved further to 400.  He is also on Singulair 10 mg at night.  He has been transition to oral prednisone now.  He is to be discharged today.  He is on pepcid for GERD.  I have advised him to follow-up with me in the pulmonary office after discharge.    Spoke to the primary team.  I had a long discussion with the patient and answered all his questions.    Thank you for allowing me to participate in the care of the patient.    Chief Complaint:   Shortness of breath wheeze    Subjective:   His shortness of breath is improved.  His wheezing is also much better.  He did not have any symptoms overnight.  He has occasional nonproductive cough.  His peak flows have improved to 400.    Objective:     Vitals: Blood pressure 138/70, pulse 74, temperature 98.5 °F (36.9 °C), temperature source Tympanic, resp. rate 16, height 5' 10\" (1.778 m), weight 72.6 kg (160 lb 2 oz), SpO2 98%.,Body mass index is 22.98 kg/m².    No intake or output data in the 24 hours ending 02/29/24 1554    Invasive Devices       Peripheral Intravenous Line  Duration             Peripheral IV 02/27/24 Dorsal (posterior);Left Forearm 1 day                    Physical Exam: On clinical examination, he is hemodynamically stable and afebrile.  Comfortable on room air at rest.  HEENT: No conjunctival pallor no cyanosis.  Neck: No jugular venous distention no significant neck or supraclavicular adenopathy.  Heart S1-S2 heard.  Chest air entry present bilaterally no " rhonchi no crackles.  Abdomen soft and nontender bowel sounds audible.  Neuro awake alert oriented.  Extremities no clubbing no edema.  Peak expiratory flow rate 400    Labs: I have personally reviewed pertinent lab results. BMP unremarkable today.  Imaging and other studies: I have personally reviewed pertinent reports.

## 2024-02-29 NOTE — ASSESSMENT & PLAN NOTE
Prior history of dyslipidemia, no longer on statin  Recommend F/U with PCP for routine lipid panel outpatient

## 2024-02-29 NOTE — DISCHARGE INSTR - AVS FIRST PAGE
You were treated and evaluated for asthma exacerbation.  This was treated with IV steroids while inpatient.  Pulmonology had evaluated you, recommend continuing maintenance inhaler, Singulair and as needed albuterol nebulizer treatments while you are in process of applying for medical assistance.  As discussed, your prior labs in 2022 showed elevated creatinine levels relating to your kidney functioning.  Your labs this admission are better than they were in 2022, however they are slightly higher than we would expect them to be.  Recommend following up with your primary care provider and/or nephrologist outpatient for further evaluation.  Avoid NSAID medications like ibuprofen/Advil/Motrin and naproxen/Aleve, as this can worsen kidney functioning. If you need to take an over-the-counter pain medication use Tylenol instead.

## 2024-03-01 ENCOUNTER — PATIENT MESSAGE (OUTPATIENT)
Dept: NEPHROLOGY | Facility: CLINIC | Age: 44
End: 2024-03-01

## 2024-03-02 LAB
BACTERIA BLD CULT: NORMAL
BACTERIA BLD CULT: NORMAL

## 2024-03-04 ENCOUNTER — TRANSITIONAL CARE MANAGEMENT (OUTPATIENT)
Dept: FAMILY MEDICINE CLINIC | Facility: CLINIC | Age: 44
End: 2024-03-04

## 2024-03-11 LAB
ATRIAL RATE: 103 BPM
P AXIS: 82 DEGREES
PR INTERVAL: 140 MS
QRS AXIS: 74 DEGREES
QRSD INTERVAL: 86 MS
QT INTERVAL: 348 MS
QTC INTERVAL: 455 MS
T WAVE AXIS: 71 DEGREES
VENTRICULAR RATE: 103 BPM